# Patient Record
Sex: FEMALE | Race: WHITE | NOT HISPANIC OR LATINO
[De-identification: names, ages, dates, MRNs, and addresses within clinical notes are randomized per-mention and may not be internally consistent; named-entity substitution may affect disease eponyms.]

---

## 2017-01-25 ENCOUNTER — MEDICATION RENEWAL (OUTPATIENT)
Age: 27
End: 2017-01-25

## 2017-01-27 ENCOUNTER — OTHER (OUTPATIENT)
Age: 27
End: 2017-01-27

## 2017-02-08 ENCOUNTER — APPOINTMENT (OUTPATIENT)
Dept: OBGYN | Facility: CLINIC | Age: 27
End: 2017-02-08

## 2017-02-08 VITALS
SYSTOLIC BLOOD PRESSURE: 114 MMHG | HEIGHT: 60 IN | DIASTOLIC BLOOD PRESSURE: 62 MMHG | BODY MASS INDEX: 21.79 KG/M2 | WEIGHT: 111 LBS | HEART RATE: 53 BPM

## 2017-02-08 DIAGNOSIS — R39.15 URGENCY OF URINATION: ICD-10-CM

## 2017-02-08 DIAGNOSIS — N92.6 IRREGULAR MENSTRUATION, UNSPECIFIED: ICD-10-CM

## 2017-02-08 DIAGNOSIS — Z32.02 ENCOUNTER FOR PREGNANCY TEST, RESULT NEGATIVE: ICD-10-CM

## 2017-02-08 DIAGNOSIS — N76.0 ACUTE VAGINITIS: ICD-10-CM

## 2017-02-08 DIAGNOSIS — R10.2 PELVIC AND PERINEAL PAIN: ICD-10-CM

## 2017-02-08 DIAGNOSIS — R14.0 ABDOMINAL DISTENSION (GASEOUS): ICD-10-CM

## 2017-02-10 ENCOUNTER — OTHER (OUTPATIENT)
Age: 27
End: 2017-02-10

## 2017-02-10 LAB
C TRACH DNA SPEC QL NAA+PROBE: NORMAL
C TRACH RRNA SPEC QL NAA+PROBE: NORMAL
N GONORRHOEA DNA SPEC QL NAA+PROBE: NORMAL
N GONORRHOEA RRNA SPEC QL NAA+PROBE: NORMAL
SOURCE AMPLIFICATION: NORMAL

## 2017-02-15 ENCOUNTER — OTHER (OUTPATIENT)
Age: 27
End: 2017-02-15

## 2017-02-15 LAB — CYTOLOGY CVX/VAG DOC THIN PREP: NORMAL

## 2017-04-28 ENCOUNTER — RX RENEWAL (OUTPATIENT)
Age: 27
End: 2017-04-28

## 2017-07-19 ENCOUNTER — OTHER (OUTPATIENT)
Age: 27
End: 2017-07-19

## 2017-08-31 ENCOUNTER — EMERGENCY (EMERGENCY)
Facility: HOSPITAL | Age: 27
LOS: 1 days | Discharge: HOME | End: 2017-08-31
Attending: INTERNAL MEDICINE | Admitting: INTERNAL MEDICINE
Payer: COMMERCIAL

## 2017-08-31 VITALS
SYSTOLIC BLOOD PRESSURE: 114 MMHG | OXYGEN SATURATION: 100 % | RESPIRATION RATE: 12 BRPM | DIASTOLIC BLOOD PRESSURE: 72 MMHG | HEART RATE: 52 BPM

## 2017-08-31 VITALS
RESPIRATION RATE: 16 BRPM | HEART RATE: 62 BPM | DIASTOLIC BLOOD PRESSURE: 72 MMHG | TEMPERATURE: 98 F | WEIGHT: 111.99 LBS | OXYGEN SATURATION: 100 % | SYSTOLIC BLOOD PRESSURE: 110 MMHG | HEIGHT: 62 IN

## 2017-08-31 DIAGNOSIS — Z90.89 ACQUIRED ABSENCE OF OTHER ORGANS: Chronic | ICD-10-CM

## 2017-08-31 LAB
ALBUMIN SERPL ELPH-MCNC: 3.7 G/DL — SIGNIFICANT CHANGE UP (ref 3.3–5)
ALP SERPL-CCNC: 37 U/L — SIGNIFICANT CHANGE UP (ref 30–120)
ALT FLD-CCNC: 23 U/L DA — SIGNIFICANT CHANGE UP (ref 10–60)
ANION GAP SERPL CALC-SCNC: 9 MMOL/L — SIGNIFICANT CHANGE UP (ref 5–17)
AST SERPL-CCNC: 20 U/L — SIGNIFICANT CHANGE UP (ref 10–40)
BASOPHILS # BLD AUTO: 0.1 K/UL — SIGNIFICANT CHANGE UP (ref 0–0.2)
BASOPHILS NFR BLD AUTO: 0.9 % — SIGNIFICANT CHANGE UP (ref 0–2)
BILIRUB SERPL-MCNC: 0.3 MG/DL — SIGNIFICANT CHANGE UP (ref 0.2–1.2)
BUN SERPL-MCNC: 9 MG/DL — SIGNIFICANT CHANGE UP (ref 7–23)
CALCIUM SERPL-MCNC: 8.9 MG/DL — SIGNIFICANT CHANGE UP (ref 8.4–10.5)
CHLORIDE SERPL-SCNC: 103 MMOL/L — SIGNIFICANT CHANGE UP (ref 96–108)
CO2 SERPL-SCNC: 27 MMOL/L — SIGNIFICANT CHANGE UP (ref 22–31)
CREAT SERPL-MCNC: 0.85 MG/DL — SIGNIFICANT CHANGE UP (ref 0.5–1.3)
EOSINOPHIL # BLD AUTO: 0 K/UL — SIGNIFICANT CHANGE UP (ref 0–0.5)
EOSINOPHIL NFR BLD AUTO: 0.6 % — SIGNIFICANT CHANGE UP (ref 0–6)
GLUCOSE SERPL-MCNC: 93 MG/DL — SIGNIFICANT CHANGE UP (ref 70–99)
HCT VFR BLD CALC: 39.8 % — SIGNIFICANT CHANGE UP (ref 34.5–45)
HGB BLD-MCNC: 13.7 G/DL — SIGNIFICANT CHANGE UP (ref 11.5–15.5)
LYMPHOCYTES # BLD AUTO: 1.8 K/UL — SIGNIFICANT CHANGE UP (ref 1–3.3)
LYMPHOCYTES # BLD AUTO: 28.1 % — SIGNIFICANT CHANGE UP (ref 13–44)
MCHC RBC-ENTMCNC: 32.2 PG — SIGNIFICANT CHANGE UP (ref 27–34)
MCHC RBC-ENTMCNC: 34.4 GM/DL — SIGNIFICANT CHANGE UP (ref 32–36)
MCV RBC AUTO: 93.8 FL — SIGNIFICANT CHANGE UP (ref 80–100)
MONOCYTES # BLD AUTO: 0.4 K/UL — SIGNIFICANT CHANGE UP (ref 0–0.9)
MONOCYTES NFR BLD AUTO: 6.2 % — SIGNIFICANT CHANGE UP (ref 2–14)
NEUTROPHILS # BLD AUTO: 4.1 K/UL — SIGNIFICANT CHANGE UP (ref 1.8–7.4)
NEUTROPHILS NFR BLD AUTO: 64.1 % — SIGNIFICANT CHANGE UP (ref 43–77)
PLATELET # BLD AUTO: 157 K/UL — SIGNIFICANT CHANGE UP (ref 150–400)
POTASSIUM SERPL-MCNC: 4.1 MMOL/L — SIGNIFICANT CHANGE UP (ref 3.5–5.3)
POTASSIUM SERPL-SCNC: 4.1 MMOL/L — SIGNIFICANT CHANGE UP (ref 3.5–5.3)
PROT SERPL-MCNC: 6.9 G/DL — SIGNIFICANT CHANGE UP (ref 6–8.3)
RBC # BLD: 4.25 M/UL — SIGNIFICANT CHANGE UP (ref 3.8–5.2)
RBC # FLD: 11.4 % — SIGNIFICANT CHANGE UP (ref 10.3–14.5)
SODIUM SERPL-SCNC: 139 MMOL/L — SIGNIFICANT CHANGE UP (ref 135–145)
WBC # BLD: 6.4 K/UL — SIGNIFICANT CHANGE UP (ref 3.8–10.5)
WBC # FLD AUTO: 6.4 K/UL — SIGNIFICANT CHANGE UP (ref 3.8–10.5)

## 2017-08-31 PROCEDURE — 74177 CT ABD & PELVIS W/CONTRAST: CPT

## 2017-08-31 PROCEDURE — 99284 EMERGENCY DEPT VISIT MOD MDM: CPT | Mod: 25

## 2017-08-31 PROCEDURE — 85027 COMPLETE CBC AUTOMATED: CPT

## 2017-08-31 PROCEDURE — 80053 COMPREHEN METABOLIC PANEL: CPT

## 2017-08-31 PROCEDURE — 99284 EMERGENCY DEPT VISIT MOD MDM: CPT

## 2017-08-31 PROCEDURE — 74177 CT ABD & PELVIS W/CONTRAST: CPT | Mod: 26

## 2017-08-31 RX ORDER — IOHEXOL 300 MG/ML
30 INJECTION, SOLUTION INTRAVENOUS ONCE
Qty: 0 | Refills: 0 | Status: DISCONTINUED | OUTPATIENT
Start: 2017-08-31 | End: 2017-09-04

## 2017-08-31 NOTE — ED PROVIDER NOTE - OBJECTIVE STATEMENT
25 y/o white female with hx of poly cystic ovaries  presents to ED c/o sharp LLQ pain  since yesterday. She states pain is radiating to left flank. hx of IBS, but IBS is not associated with same pain she had since yesterday. no OTC medication. pt's boyfriend is concern she has diverticulitis,. No fevers, chills, hematuria, dysuria. no further complaints at this time. 27 y/o white female with hx of polycystic ovaries  presents to ED c/o sharp LLQ pain  since yesterday. She states pain is radiating to left flank. hx of IBS, but IBS is not associated with same pain she had since yesterday. no OTC medication. pt's boyfriend is concerned that she has diverticulitis,. No fevers, chills, hematuria, dysuria or dc. no further complaints at this time.pt and boyfriend are somewhat demanding a ct scan.

## 2017-08-31 NOTE — ED PROVIDER NOTE - NS_ ATTENDINGSCRIBEDETAILS _ED_A_ED_FT
Julián Preciado MD - The scribe's documentation has been prepared under my direction and personally reviewed by me in its entirety. I confirm that the note above accurately reflects all work, treatment, procedures, and medical decision making performed by me.

## 2017-08-31 NOTE — ED ADULT NURSE NOTE - NS ED NURSE DC INFO COMPLEXITY
Complex: Multiple Rx/Tx. Pt has difficulty understanding. Requires additional help/Patient asked questions/Verbalized Understanding

## 2017-08-31 NOTE — ED ADULT NURSE NOTE - OBJECTIVE STATEMENT
Patient received AOx3 complaining of LLQ pain that radiates to left flank, denies difficulty urinating, fever, chills n/v/d. As per patient has history of IBS, is concerned it can be diverticulitis.

## 2017-08-31 NOTE — ED ADULT NURSE REASSESSMENT NOTE - NS ED NURSE REASSESS COMMENT FT1
PO Oral contrast completed at 1500. Awaiting CT scan.
pt re evaluated by md and to be d'c/d pt discharged stable and ambulatory in nad at present d/c instruction reinforced and pt verbalized understanding vital signs as charted  iv d'c/d  no s/s infiltration upon removal no vomiting pt feels better to f/u pmd

## 2017-08-31 NOTE — ED PROVIDER NOTE - MEDICAL DECISION MAKING DETAILS
abdom pain with h/o ibs, but never really worked up...pt/boyfriend rather convinced pt needs ct despite my recommendation that it seems unnecessary.ct only with large amt feces.d/w pt her vegan diet,fluids and liquids like prune juice.

## 2017-08-31 NOTE — ED ADULT TRIAGE NOTE - CHIEF COMPLAINT QUOTE
ibs constipation c/o llq pains denies fever or chills denies n/v sexually active  no vag d/c  denies dysuria

## 2018-01-18 NOTE — ED PROVIDER NOTE - GASTROINTESTINAL [+], MLM
Chief Complaint   Patient presents with   • Cough   • Vomiting   • Chills         HPI: The pt complains of cough, vomiting secondary to cough, fevers, and chills. He has a history of asthma.        Pe: Coarse breath sound with expiratory wheezing in bilateral lower lobes, and clear nasal discharge.         Plan: Influenza swab, CXR, respiratory treatment, and further evaluation by an ED provider.     ________________________________________________________________    I have reviewed the information recorded by the scribe for accuracy and agree with its contents.    Katie Pardo acting as scribe for Reanna Alvarenga PA-C    Scribe: Katie Pardo  Physician Assistant: Reanna Alvarenga PA-C  1/18/2018       LIZ Yeh  01/18/18 0268    
ABDOMINAL PAIN

## 2018-04-26 ENCOUNTER — RX RENEWAL (OUTPATIENT)
Age: 28
End: 2018-04-26

## 2018-04-28 ENCOUNTER — OUTPATIENT (OUTPATIENT)
Dept: OUTPATIENT SERVICES | Facility: HOSPITAL | Age: 28
LOS: 1 days | End: 2018-04-28
Payer: COMMERCIAL

## 2018-04-28 ENCOUNTER — APPOINTMENT (OUTPATIENT)
Dept: RADIOLOGY | Facility: HOSPITAL | Age: 28
End: 2018-04-28
Payer: COMMERCIAL

## 2018-04-28 DIAGNOSIS — Z00.8 ENCOUNTER FOR OTHER GENERAL EXAMINATION: ICD-10-CM

## 2018-04-28 DIAGNOSIS — Z90.89 ACQUIRED ABSENCE OF OTHER ORGANS: Chronic | ICD-10-CM

## 2018-04-28 PROCEDURE — 74022 RADEX COMPL AQT ABD SERIES: CPT | Mod: 26

## 2018-04-28 PROCEDURE — 74022 RADEX COMPL AQT ABD SERIES: CPT

## 2018-05-15 ENCOUNTER — OTHER (OUTPATIENT)
Age: 28
End: 2018-05-15

## 2018-05-29 ENCOUNTER — APPOINTMENT (OUTPATIENT)
Dept: OBGYN | Facility: CLINIC | Age: 28
End: 2018-05-29

## 2018-07-23 ENCOUNTER — APPOINTMENT (OUTPATIENT)
Dept: OBGYN | Facility: CLINIC | Age: 28
End: 2018-07-23

## 2018-07-23 DIAGNOSIS — Z30.41 ENCOUNTER FOR SURVEILLANCE OF CONTRACEPTIVE PILLS: ICD-10-CM

## 2018-07-30 ENCOUNTER — RESULT REVIEW (OUTPATIENT)
Age: 28
End: 2018-07-30

## 2018-11-29 ENCOUNTER — EMERGENCY (EMERGENCY)
Facility: HOSPITAL | Age: 28
LOS: 1 days | Discharge: HOME | End: 2018-11-29
Attending: EMERGENCY MEDICINE | Admitting: EMERGENCY MEDICINE
Payer: COMMERCIAL

## 2018-11-29 VITALS
TEMPERATURE: 98 F | DIASTOLIC BLOOD PRESSURE: 68 MMHG | RESPIRATION RATE: 18 BRPM | HEART RATE: 71 BPM | HEIGHT: 61 IN | SYSTOLIC BLOOD PRESSURE: 126 MMHG | OXYGEN SATURATION: 100 % | WEIGHT: 110.01 LBS

## 2018-11-29 DIAGNOSIS — Z90.89 ACQUIRED ABSENCE OF OTHER ORGANS: Chronic | ICD-10-CM

## 2018-11-29 PROCEDURE — 99283 EMERGENCY DEPT VISIT LOW MDM: CPT

## 2018-11-29 PROCEDURE — 73610 X-RAY EXAM OF ANKLE: CPT | Mod: 26,LT

## 2018-11-29 PROCEDURE — 73610 X-RAY EXAM OF ANKLE: CPT

## 2018-11-29 RX ORDER — IBUPROFEN 200 MG
600 TABLET ORAL ONCE
Qty: 0 | Refills: 0 | Status: COMPLETED | OUTPATIENT
Start: 2018-11-29 | End: 2018-11-29

## 2018-11-29 RX ADMIN — Medication 600 MILLIGRAM(S): at 19:36

## 2018-11-29 RX ADMIN — Medication 600 MILLIGRAM(S): at 20:36

## 2018-11-29 NOTE — ED ADULT NURSE NOTE - NSIMPLEMENTINTERV_GEN_ALL_ED
Implemented All Universal Safety Interventions:  [place] to call system. Call bell, personal items and telephone within reach. Instruct patient to call for assistance. Room bathroom lighting operational. Non-slip footwear when patient is off stretcher. Physically safe environment: no spills, clutter or unnecessary equipment. Stretcher in lowest position, wheels locked, appropriate side rails in place.

## 2018-11-29 NOTE — ED ADULT NURSE NOTE - OBJECTIVE STATEMENT
pt reports twisting left ankle while running. swelling, +1 swelling. no bruising noted. pt reports pain 8/10.

## 2018-11-29 NOTE — ED PROVIDER NOTE - OBJECTIVE STATEMENT
pt rolled her L ankle today around 5p while running outside when she stepped in a ditch.  pt able to bare weight but has swelling and mild pain.

## 2018-11-30 PROBLEM — K58.9 IRRITABLE BOWEL SYNDROME WITHOUT DIARRHEA: Chronic | Status: ACTIVE | Noted: 2017-08-31

## 2019-01-14 ENCOUNTER — APPOINTMENT (OUTPATIENT)
Dept: ORTHOPEDIC SURGERY | Facility: CLINIC | Age: 29
End: 2019-01-14

## 2019-01-27 ENCOUNTER — TRANSCRIPTION ENCOUNTER (OUTPATIENT)
Age: 29
End: 2019-01-27

## 2019-03-01 ENCOUNTER — EMERGENCY (EMERGENCY)
Facility: HOSPITAL | Age: 29
LOS: 1 days | Discharge: HOME | End: 2019-03-01
Attending: EMERGENCY MEDICINE
Payer: COMMERCIAL

## 2019-03-01 VITALS
DIASTOLIC BLOOD PRESSURE: 78 MMHG | HEART RATE: 57 BPM | SYSTOLIC BLOOD PRESSURE: 113 MMHG | TEMPERATURE: 98 F | RESPIRATION RATE: 18 BRPM | OXYGEN SATURATION: 99 %

## 2019-03-01 VITALS
OXYGEN SATURATION: 99 % | SYSTOLIC BLOOD PRESSURE: 147 MMHG | RESPIRATION RATE: 17 BRPM | HEART RATE: 63 BPM | DIASTOLIC BLOOD PRESSURE: 94 MMHG | TEMPERATURE: 99 F

## 2019-03-01 DIAGNOSIS — Z90.89 ACQUIRED ABSENCE OF OTHER ORGANS: Chronic | ICD-10-CM

## 2019-03-01 LAB
ALBUMIN SERPL ELPH-MCNC: 4.6 G/DL — SIGNIFICANT CHANGE UP (ref 3.3–5)
ALP SERPL-CCNC: 46 U/L — SIGNIFICANT CHANGE UP (ref 40–120)
ALT FLD-CCNC: 16 U/L — SIGNIFICANT CHANGE UP (ref 10–45)
ANION GAP SERPL CALC-SCNC: 12 MMOL/L — SIGNIFICANT CHANGE UP (ref 5–17)
APPEARANCE UR: CLEAR — SIGNIFICANT CHANGE UP
AST SERPL-CCNC: 22 U/L — SIGNIFICANT CHANGE UP (ref 10–40)
BACTERIA # UR AUTO: NEGATIVE — SIGNIFICANT CHANGE UP
BASOPHILS # BLD AUTO: 0 K/UL — SIGNIFICANT CHANGE UP (ref 0–0.2)
BASOPHILS NFR BLD AUTO: 0.5 % — SIGNIFICANT CHANGE UP (ref 0–2)
BILIRUB SERPL-MCNC: 0.1 MG/DL — LOW (ref 0.2–1.2)
BILIRUB UR-MCNC: NEGATIVE — SIGNIFICANT CHANGE UP
BUN SERPL-MCNC: 18 MG/DL — SIGNIFICANT CHANGE UP (ref 7–23)
CALCIUM SERPL-MCNC: 9.6 MG/DL — SIGNIFICANT CHANGE UP (ref 8.4–10.5)
CHLORIDE SERPL-SCNC: 101 MMOL/L — SIGNIFICANT CHANGE UP (ref 96–108)
CO2 SERPL-SCNC: 24 MMOL/L — SIGNIFICANT CHANGE UP (ref 22–31)
COLOR SPEC: COLORLESS — SIGNIFICANT CHANGE UP
CREAT SERPL-MCNC: 0.82 MG/DL — SIGNIFICANT CHANGE UP (ref 0.5–1.3)
DIFF PNL FLD: NEGATIVE — SIGNIFICANT CHANGE UP
EOSINOPHIL # BLD AUTO: 0.1 K/UL — SIGNIFICANT CHANGE UP (ref 0–0.5)
EOSINOPHIL NFR BLD AUTO: 1.2 % — SIGNIFICANT CHANGE UP (ref 0–6)
EPI CELLS # UR: 1 /HPF — SIGNIFICANT CHANGE UP
GLUCOSE SERPL-MCNC: 87 MG/DL — SIGNIFICANT CHANGE UP (ref 70–99)
GLUCOSE UR QL: NEGATIVE — SIGNIFICANT CHANGE UP
HCT VFR BLD CALC: 41.2 % — SIGNIFICANT CHANGE UP (ref 34.5–45)
HGB BLD-MCNC: 14.5 G/DL — SIGNIFICANT CHANGE UP (ref 11.5–15.5)
HIV 1 & 2 AB SERPL IA.RAPID: SIGNIFICANT CHANGE UP
HYALINE CASTS # UR AUTO: 1 /LPF — SIGNIFICANT CHANGE UP (ref 0–2)
KETONES UR-MCNC: NEGATIVE — SIGNIFICANT CHANGE UP
LEUKOCYTE ESTERASE UR-ACNC: NEGATIVE — SIGNIFICANT CHANGE UP
LYMPHOCYTES # BLD AUTO: 1.8 K/UL — SIGNIFICANT CHANGE UP (ref 1–3.3)
LYMPHOCYTES # BLD AUTO: 23.8 % — SIGNIFICANT CHANGE UP (ref 13–44)
MAGNESIUM SERPL-MCNC: 2.2 MG/DL — SIGNIFICANT CHANGE UP (ref 1.6–2.6)
MCHC RBC-ENTMCNC: 32.1 PG — SIGNIFICANT CHANGE UP (ref 27–34)
MCHC RBC-ENTMCNC: 35.2 GM/DL — SIGNIFICANT CHANGE UP (ref 32–36)
MCV RBC AUTO: 91.1 FL — SIGNIFICANT CHANGE UP (ref 80–100)
MONOCYTES # BLD AUTO: 0.4 K/UL — SIGNIFICANT CHANGE UP (ref 0–0.9)
MONOCYTES NFR BLD AUTO: 5.8 % — SIGNIFICANT CHANGE UP (ref 2–14)
NEUTROPHILS # BLD AUTO: 5.1 K/UL — SIGNIFICANT CHANGE UP (ref 1.8–7.4)
NEUTROPHILS NFR BLD AUTO: 68.7 % — SIGNIFICANT CHANGE UP (ref 43–77)
NITRITE UR-MCNC: NEGATIVE — SIGNIFICANT CHANGE UP
PH UR: 6.5 — SIGNIFICANT CHANGE UP (ref 5–8)
PLATELET # BLD AUTO: 177 K/UL — SIGNIFICANT CHANGE UP (ref 150–400)
POTASSIUM SERPL-MCNC: 3.9 MMOL/L — SIGNIFICANT CHANGE UP (ref 3.5–5.3)
POTASSIUM SERPL-SCNC: 3.9 MMOL/L — SIGNIFICANT CHANGE UP (ref 3.5–5.3)
PROT SERPL-MCNC: 7.2 G/DL — SIGNIFICANT CHANGE UP (ref 6–8.3)
PROT UR-MCNC: NEGATIVE — SIGNIFICANT CHANGE UP
RBC # BLD: 4.52 M/UL — SIGNIFICANT CHANGE UP (ref 3.8–5.2)
RBC # FLD: 11.8 % — SIGNIFICANT CHANGE UP (ref 10.3–14.5)
RBC CASTS # UR COMP ASSIST: 1 /HPF — SIGNIFICANT CHANGE UP (ref 0–4)
SODIUM SERPL-SCNC: 137 MMOL/L — SIGNIFICANT CHANGE UP (ref 135–145)
SP GR SPEC: 1.01 — LOW (ref 1.01–1.02)
UROBILINOGEN FLD QL: NEGATIVE — SIGNIFICANT CHANGE UP
WBC # BLD: 7.5 K/UL — SIGNIFICANT CHANGE UP (ref 3.8–10.5)
WBC # FLD AUTO: 7.5 K/UL — SIGNIFICANT CHANGE UP (ref 3.8–10.5)
WBC UR QL: 0 /HPF — SIGNIFICANT CHANGE UP (ref 0–5)

## 2019-03-01 PROCEDURE — 83735 ASSAY OF MAGNESIUM: CPT

## 2019-03-01 PROCEDURE — 76830 TRANSVAGINAL US NON-OB: CPT | Mod: 26

## 2019-03-01 PROCEDURE — 74018 RADEX ABDOMEN 1 VIEW: CPT | Mod: 26

## 2019-03-01 PROCEDURE — 99284 EMERGENCY DEPT VISIT MOD MDM: CPT

## 2019-03-01 PROCEDURE — 85027 COMPLETE CBC AUTOMATED: CPT

## 2019-03-01 PROCEDURE — 93975 VASCULAR STUDY: CPT | Mod: 26

## 2019-03-01 PROCEDURE — 80053 COMPREHEN METABOLIC PANEL: CPT

## 2019-03-01 PROCEDURE — 87086 URINE CULTURE/COLONY COUNT: CPT

## 2019-03-01 PROCEDURE — 86703 HIV-1/HIV-2 1 RESULT ANTBDY: CPT

## 2019-03-01 PROCEDURE — 93975 VASCULAR STUDY: CPT

## 2019-03-01 PROCEDURE — 99284 EMERGENCY DEPT VISIT MOD MDM: CPT | Mod: 25

## 2019-03-01 PROCEDURE — 81001 URINALYSIS AUTO W/SCOPE: CPT

## 2019-03-01 PROCEDURE — 74018 RADEX ABDOMEN 1 VIEW: CPT

## 2019-03-01 PROCEDURE — 96374 THER/PROPH/DIAG INJ IV PUSH: CPT

## 2019-03-01 PROCEDURE — 76830 TRANSVAGINAL US NON-OB: CPT

## 2019-03-01 RX ORDER — ONDANSETRON 8 MG/1
4 TABLET, FILM COATED ORAL ONCE
Qty: 0 | Refills: 0 | Status: COMPLETED | OUTPATIENT
Start: 2019-03-01 | End: 2019-03-01

## 2019-03-01 RX ORDER — ACETAMINOPHEN 500 MG
975 TABLET ORAL ONCE
Qty: 0 | Refills: 0 | Status: COMPLETED | OUTPATIENT
Start: 2019-03-01 | End: 2019-03-01

## 2019-03-01 RX ORDER — SODIUM CHLORIDE 9 MG/ML
1000 INJECTION INTRAMUSCULAR; INTRAVENOUS; SUBCUTANEOUS ONCE
Qty: 0 | Refills: 0 | Status: COMPLETED | OUTPATIENT
Start: 2019-03-01 | End: 2019-03-01

## 2019-03-01 RX ORDER — KETOROLAC TROMETHAMINE 30 MG/ML
15 SYRINGE (ML) INJECTION ONCE
Qty: 0 | Refills: 0 | Status: DISCONTINUED | OUTPATIENT
Start: 2019-03-01 | End: 2019-03-01

## 2019-03-01 RX ADMIN — Medication 15 MILLIGRAM(S): at 18:57

## 2019-03-01 RX ADMIN — Medication 975 MILLIGRAM(S): at 18:59

## 2019-03-01 RX ADMIN — SODIUM CHLORIDE 1000 MILLILITER(S): 9 INJECTION INTRAMUSCULAR; INTRAVENOUS; SUBCUTANEOUS at 18:59

## 2019-03-01 NOTE — ED PROVIDER NOTE - NSFOLLOWUPINSTRUCTIONS_ED_ALL_ED_FT
-Follow-up with your primary care provider within 2-3 days.   -Follow-up with your GI doctor as scheduled.    -Bring all printed results to discuss with your PCP.    -Take Magnesium Citrate liquid form (290mg/5mL) over the counter, take 150mL at a time with at least 8 ounces of water, 2 times per day tomorrow. If constipation is not relieved repeat this the following day.      Drink plenty of water while taking magnesium citrate. And do not take another dose after you have a      loose bowel movement.    -Continue to take all other medications as directed.    -Return to the emergency room immediately if your symptoms worsen or persist, or if you have a high or concerning fever, abdominal pain, bloody stool, severe vomiting, inability to keep food or water down, lightheadedness, dizziness, loss of consciousness (passing out) or if any other concerning or questionable symptoms.

## 2019-03-01 NOTE — ED PROVIDER NOTE - PHYSICAL EXAMINATION
GEN: Pt in NAD, A&O x3.  PSYCH: Affect appropriate.  EYES: Sclera white w/o injection, PERRLA.   ENT: Head NCAT. Nose w/o deformity. No auricular TTP. MMM. Neck supple FROM.   RESP: No chest wall tenderness, CTA b/l, no wheezes, rales, or rhonchi.   CARDIAC: RRR, clear distinct S1, S2, no appreciable murmurs.  ABD: Abdomen flat, NABS, soft, +suprapubic ttp, no rebound or guarding, negative psoas, obturator, and rovsing sign. No CVAT b/l.  VASC: No edema or calf tenderness.  SKIN: No rashes on the trunk.

## 2019-03-01 NOTE — ED ADULT NURSE NOTE - OBJECTIVE STATEMENT
Pt has been having low abd and pelvic pain, different from her IBS pain x 1 month.  Has been seen at urgent care and did 3 day course of abx for possible uti.  No burning with urination, but does have some frequency.  Oral mucosa slightly dry.  Abd soft.

## 2019-03-01 NOTE — ED PROVIDER NOTE - OBJECTIVE STATEMENT
29 y/o F with PMHx of IBS w/ predominant constipation presents with Mom at bedside c/o suprapubic pain and frequency x 1 month worsening x 3 days. Pt also notes a feeling of fatigue and malaise x 1 month also worsening the last 3 days, constipation and low back pain worse x 3 days last BM brown yesterday, frequent nausea w/o vomiting. Suprapubic pain is constant, currently moderate-severe, no radiation, dull intermittently sharp, worse with walking and eating. Pt was seen at UC 3 weeks ago given Keflex x3 days and stopped du to negative UCx. Pt saw a GI yesterday and schedules for outpt colonoscopy/endoscopy. Pt notes that she has had IBS with frequent episodes of constipation and abd pain but usually do not last this long. Pt denies f/c, recent illness, hematuria, h/o kidney stones/pyleo, melena, hemaotchezia, new meds (only on OCPs), rashes, joint pain, flank pain, h/o abd surgery. 27 y/o F with PMHx of IBS w/ predominant constipation presents with Mom at bedside c/o suprapubic pain and frequency x 1 month worsening x 3 days. Pt also notes a feeling of fatigue and malaise x 1 month also worsening the last 3 days, constipation and low back pain worse x 3 days last BM brown yesterday, frequent nausea w/o vomiting. Suprapubic pain is constant, currently moderate-severe, no radiation, dull intermittently sharp, worse with walking and eating. Pt was seen at UC 3 weeks ago given Keflex x3 days and stopped du to negative UCx. Pt saw a GI yesterday and schedules for outpt colonoscopy/endoscopy. Pt notes that she has had IBS with frequent episodes of constipation and abd pain but usually do not last this long. Pt denies f/c, recent illness, hematuria, h/o kidney stones/pyleo, melena, hemaotchezia, new meds (only on OCPs), rashes, joint pain, flank pain, h/o abd surgery, vag bleed/DC. LMP 3 weeks ago.

## 2019-03-01 NOTE — ED PROVIDER NOTE - PLAN OF CARE
Follow-up with your primary care provider within 2-3 days.   Follow-up with your GI doctor as scheduled.  Bring all printed results to discuss with your PCP.  Take Magnesium Citrate liquid form (290mg/5mL) over the counter, take 150mL at a time with at least 8 ounces of water, 2 times per day tomorrow. If constipation is not relieved repeat this the following day. Drink plenty of water while taking magnesium citrate. And do not take another dose after you have a loose bowel movement.  Continue to take all other medications as directed.  Return to the emergency room immediately if your symptoms worsen or persist, or if you have a high or concerning fever, abdominal pain, bloody stool, severe vomiting, inability to keep food or water down, lightheadedness, dizziness, loss of consciousness (passing out) or if any other concerning or questionable symptoms.

## 2019-03-01 NOTE — ED PROVIDER NOTE - PROGRESS NOTE DETAILS
discussed results with pt thus far CBC/CMP/MG/HIV/AXR, pt is feeling better after Tylenol and Toradol, visibly improved, laughing and talking on phone. Pt amenable to Magnesium citrate laxative outpt pending non-actionable TVUS, states she tried Mg laxative 2 weeks ago 1/2 dose with some liquid stool but not complete relief. Colonoscopy is in >2 weeks, explained importance of hydration during bowel prep and laxative use to prevent dehydration. -Julio Cobos PA-C All results d/w pt, pt given return precautions and told to f/u with GI as scheduled for colonoscopy, call to inform she was in the ER. Follow-up PCP within 48 hours. Pain meds at home and instructions for laxative use. Pt understands and agrees to plan, all questions answered, pt ready and stable for DC. -Julio Cobos PA-C

## 2019-03-01 NOTE — ED PROVIDER NOTE - CLINICAL SUMMARY MEDICAL DECISION MAKING FREE TEXT BOX
MARCELO Ken MD: Pt is a 29 y/o female with PMH of IBS (predominant constipation sx) who p/w c/o abd pain and constipation. Per patient and her mother, pt c/o suprapubic pain and frequency x 1 month, worsening x 3 days. Also c/o constipation and low back pain, worse x 3 days. Patient states last BM was yesterday. Also c/o frequent nausea, no vomiting. Suprapubic pain is constant, currently moderate-severe, no radiation, dull intermittently sharp, worse with walking and eating. Pt was seen at UC 3 weeks ago given Keflex x3 days for a possible UTI, but was advised to stop due to negative UCx. Pt saw a GI yesterday who scheduled pt for outpt colonoscopy/endoscopy. Pt notes that she has had IBS with frequent episodes of similar constipation and abd pain, however, the sx usually do not last this long. Pt denies f/c, recent illness, hematuria, h/o kidney stones/pyleo, melena, hemaotchezia, new meds (only on OCPs), rashes, joint pain, flank pain, h/o abd surgery, vag bleed/DC. LMP 2 weeks ago. Exam unremarkable aside from b/l lower abd ttp, without rebound/guarding/rigidity. Neg rosving's sign. Neg mcburney's point ttp. Neg CVAT. Well-appearing, non-toxic. DDx: IBS, constipation, ovarian cyst, pregnancy. Less likely appy given 1 month of sx. Plan: pain control, basic labs, upreg, u/a, ucx, AXR to look for stool burden, TVUS. Discussed risks and benefits of CT scan with patient. She does not want to have CT scan at this time. Low suspicion for appy at this time, however, if initial w/u negative, pt may be amenable to CT scan for further eval.

## 2019-03-01 NOTE — ED PROVIDER NOTE - CARE PLAN
Principal Discharge DX:	Abdominal pain  Assessment and plan of treatment:	Follow-up with your primary care provider within 2-3 days.   Follow-up with your GI doctor as scheduled.  Bring all printed results to discuss with your PCP.  Take Magnesium Citrate liquid form (290mg/5mL) over the counter, take 150mL at a time with at least 8 ounces of water, 2 times per day tomorrow. If constipation is not relieved repeat this the following day. Drink plenty of water while taking magnesium citrate. And do not take another dose after you have a loose bowel movement.  Continue to take all other medications as directed.  Return to the emergency room immediately if your symptoms worsen or persist, or if you have a high or concerning fever, abdominal pain, bloody stool, severe vomiting, inability to keep food or water down, lightheadedness, dizziness, loss of consciousness (passing out) or if any other concerning or questionable symptoms.  Secondary Diagnosis:	Constipation

## 2019-03-02 LAB
CULTURE RESULTS: NO GROWTH — SIGNIFICANT CHANGE UP
SPECIMEN SOURCE: SIGNIFICANT CHANGE UP

## 2019-03-02 NOTE — ED POST DISCHARGE NOTE - RESULT SUMMARY
small calcification in pelvis previously not see on abd xray likely phlebolith, called to inform pt and see how she was feeling

## 2019-03-02 NOTE — ED POST DISCHARGE NOTE - DETAILS
Spoke with pt feeling better, informed of Xray findings and its likely benign nature and to follow up if she has any concerns with her PCP.  Sly

## 2019-06-10 ENCOUNTER — APPOINTMENT (OUTPATIENT)
Dept: INTERNAL MEDICINE | Facility: CLINIC | Age: 29
End: 2019-06-10
Payer: COMMERCIAL

## 2019-06-10 VITALS
SYSTOLIC BLOOD PRESSURE: 108 MMHG | RESPIRATION RATE: 16 BRPM | TEMPERATURE: 98 F | DIASTOLIC BLOOD PRESSURE: 70 MMHG | HEART RATE: 60 BPM | WEIGHT: 112 LBS | OXYGEN SATURATION: 100 % | HEIGHT: 60 IN | BODY MASS INDEX: 21.99 KG/M2

## 2019-06-10 DIAGNOSIS — Z78.9 OTHER SPECIFIED HEALTH STATUS: ICD-10-CM

## 2019-06-10 DIAGNOSIS — Z80.3 FAMILY HISTORY OF MALIGNANT NEOPLASM OF BREAST: ICD-10-CM

## 2019-06-10 DIAGNOSIS — N94.9 UNSPECIFIED CONDITION ASSOCIATED WITH FEMALE GENITAL ORGANS AND MENSTRUAL CYCLE: ICD-10-CM

## 2019-06-10 DIAGNOSIS — Z01.419 ENCOUNTER FOR GYNECOLOGICAL EXAMINATION (GENERAL) (ROUTINE) W/OUT ABNORMAL FINDINGS: ICD-10-CM

## 2019-06-10 DIAGNOSIS — K59.09 OTHER CONSTIPATION: ICD-10-CM

## 2019-06-10 PROCEDURE — 99204 OFFICE O/P NEW MOD 45 MIN: CPT

## 2019-06-10 RX ORDER — DESOGESTREL AND ETHINYL ESTRADIOL 0.15-0.03
0.15-3 KIT ORAL DAILY
Qty: 3 | Refills: 0 | Status: DISCONTINUED | COMMUNITY
Start: 2017-02-08 | End: 2019-06-10

## 2019-06-12 LAB
BASOPHILS # BLD AUTO: 0.02 K/UL
BASOPHILS NFR BLD AUTO: 0.5 %
EOSINOPHIL # BLD AUTO: 0.02 K/UL
EOSINOPHIL NFR BLD AUTO: 0.5 %
HCT VFR BLD CALC: 39.9 %
HGB BLD-MCNC: 13.4 G/DL
IF BLOCK AB SER QL: NORMAL
IMM GRANULOCYTES NFR BLD AUTO: 0.3 %
LYMPHOCYTES # BLD AUTO: 1.57 K/UL
LYMPHOCYTES NFR BLD AUTO: 40.7 %
MAN DIFF?: NORMAL
MCHC RBC-ENTMCNC: 31.8 PG
MCHC RBC-ENTMCNC: 33.6 GM/DL
MCV RBC AUTO: 94.5 FL
METHYLMALONATE SERPL-SCNC: 64 NMOL/L
MONOCYTES # BLD AUTO: 0.39 K/UL
MONOCYTES NFR BLD AUTO: 10.1 %
NEUTROPHILS # BLD AUTO: 1.85 K/UL
NEUTROPHILS NFR BLD AUTO: 47.9 %
PLATELET # BLD AUTO: 193 K/UL
RBC # BLD: 4.22 M/UL
RBC # FLD: 12.4 %
T4 FREE SERPL-MCNC: 1 NG/DL
TSH SERPL-ACNC: 1.37 UIU/ML
VIT B12 SERPL-MCNC: 898 PG/ML
WBC # FLD AUTO: 3.86 K/UL

## 2019-06-19 LAB
TTG IGA SER IA-ACNC: <1.2 U/ML
TTG IGA SER-ACNC: NEGATIVE
TTG IGG SER IA-ACNC: 1.3 U/ML
TTG IGG SER IA-ACNC: NEGATIVE

## 2019-07-11 ENCOUNTER — APPOINTMENT (OUTPATIENT)
Dept: INTERNAL MEDICINE | Facility: CLINIC | Age: 29
End: 2019-07-11
Payer: COMMERCIAL

## 2019-07-11 VITALS
HEART RATE: 56 BPM | OXYGEN SATURATION: 98 % | HEIGHT: 70 IN | BODY MASS INDEX: 16.03 KG/M2 | SYSTOLIC BLOOD PRESSURE: 110 MMHG | WEIGHT: 112 LBS | DIASTOLIC BLOOD PRESSURE: 74 MMHG | RESPIRATION RATE: 16 BRPM | TEMPERATURE: 98 F

## 2019-07-11 DIAGNOSIS — K58.9 IRRITABLE BOWEL SYNDROME W/OUT DIARRHEA: ICD-10-CM

## 2019-07-11 DIAGNOSIS — E53.8 DEFICIENCY OF OTHER SPECIFIED B GROUP VITAMINS: ICD-10-CM

## 2019-07-11 DIAGNOSIS — R53.83 OTHER FATIGUE: ICD-10-CM

## 2019-07-11 PROCEDURE — 99214 OFFICE O/P EST MOD 30 MIN: CPT

## 2019-09-19 ENCOUNTER — EMERGENCY (EMERGENCY)
Facility: HOSPITAL | Age: 29
LOS: 1 days | Discharge: HOME | End: 2019-09-19
Attending: EMERGENCY MEDICINE
Payer: COMMERCIAL

## 2019-09-19 VITALS
DIASTOLIC BLOOD PRESSURE: 75 MMHG | SYSTOLIC BLOOD PRESSURE: 112 MMHG | RESPIRATION RATE: 17 BRPM | OXYGEN SATURATION: 97 % | HEIGHT: 61 IN | TEMPERATURE: 98 F | HEART RATE: 61 BPM | WEIGHT: 111.99 LBS

## 2019-09-19 VITALS
RESPIRATION RATE: 16 BRPM | SYSTOLIC BLOOD PRESSURE: 110 MMHG | HEART RATE: 58 BPM | OXYGEN SATURATION: 98 % | TEMPERATURE: 98 F | DIASTOLIC BLOOD PRESSURE: 72 MMHG

## 2019-09-19 DIAGNOSIS — Z90.89 ACQUIRED ABSENCE OF OTHER ORGANS: Chronic | ICD-10-CM

## 2019-09-19 LAB
ALBUMIN SERPL ELPH-MCNC: 4 G/DL — SIGNIFICANT CHANGE UP (ref 3.3–5)
ALP SERPL-CCNC: 42 U/L — SIGNIFICANT CHANGE UP (ref 40–120)
ALT FLD-CCNC: 13 U/L — SIGNIFICANT CHANGE UP (ref 10–45)
ANION GAP SERPL CALC-SCNC: 12 MMOL/L — SIGNIFICANT CHANGE UP (ref 5–17)
APPEARANCE UR: CLEAR — SIGNIFICANT CHANGE UP
APTT BLD: 30.2 SEC — SIGNIFICANT CHANGE UP (ref 27.5–36.3)
AST SERPL-CCNC: 22 U/L — SIGNIFICANT CHANGE UP (ref 10–40)
BACTERIA # UR AUTO: NEGATIVE — SIGNIFICANT CHANGE UP
BASOPHILS # BLD AUTO: 0 K/UL — SIGNIFICANT CHANGE UP (ref 0–0.2)
BASOPHILS NFR BLD AUTO: 0.5 % — SIGNIFICANT CHANGE UP (ref 0–2)
BILIRUB SERPL-MCNC: 0.1 MG/DL — LOW (ref 0.2–1.2)
BILIRUB UR-MCNC: NEGATIVE — SIGNIFICANT CHANGE UP
BUN SERPL-MCNC: 12 MG/DL — SIGNIFICANT CHANGE UP (ref 7–23)
CALCIUM SERPL-MCNC: 9.3 MG/DL — SIGNIFICANT CHANGE UP (ref 8.4–10.5)
CHLORIDE SERPL-SCNC: 103 MMOL/L — SIGNIFICANT CHANGE UP (ref 96–108)
CO2 SERPL-SCNC: 25 MMOL/L — SIGNIFICANT CHANGE UP (ref 22–31)
COLOR SPEC: SIGNIFICANT CHANGE UP
CREAT SERPL-MCNC: 0.89 MG/DL — SIGNIFICANT CHANGE UP (ref 0.5–1.3)
DIFF PNL FLD: NEGATIVE — SIGNIFICANT CHANGE UP
EOSINOPHIL # BLD AUTO: 0.1 K/UL — SIGNIFICANT CHANGE UP (ref 0–0.5)
EOSINOPHIL NFR BLD AUTO: 0.8 % — SIGNIFICANT CHANGE UP (ref 0–6)
EPI CELLS # UR: 3 /HPF — SIGNIFICANT CHANGE UP
GAS PNL BLDV: SIGNIFICANT CHANGE UP
GLUCOSE SERPL-MCNC: 108 MG/DL — HIGH (ref 70–99)
GLUCOSE UR QL: NEGATIVE — SIGNIFICANT CHANGE UP
HCG SERPL-ACNC: <2 MIU/ML — SIGNIFICANT CHANGE UP
HCG UR QL: NEGATIVE — SIGNIFICANT CHANGE UP
HCT VFR BLD CALC: 36.8 % — SIGNIFICANT CHANGE UP (ref 34.5–45)
HGB BLD-MCNC: 13.3 G/DL — SIGNIFICANT CHANGE UP (ref 11.5–15.5)
HYALINE CASTS # UR AUTO: 0 /LPF — SIGNIFICANT CHANGE UP (ref 0–2)
INR BLD: 1.02 RATIO — SIGNIFICANT CHANGE UP (ref 0.88–1.16)
KETONES UR-MCNC: NEGATIVE — SIGNIFICANT CHANGE UP
LEUKOCYTE ESTERASE UR-ACNC: NEGATIVE — SIGNIFICANT CHANGE UP
LYMPHOCYTES # BLD AUTO: 2.2 K/UL — SIGNIFICANT CHANGE UP (ref 1–3.3)
LYMPHOCYTES # BLD AUTO: 35.3 % — SIGNIFICANT CHANGE UP (ref 13–44)
MCHC RBC-ENTMCNC: 34.8 PG — HIGH (ref 27–34)
MCHC RBC-ENTMCNC: 36.2 GM/DL — HIGH (ref 32–36)
MCV RBC AUTO: 96.1 FL — SIGNIFICANT CHANGE UP (ref 80–100)
MONOCYTES # BLD AUTO: 0.4 K/UL — SIGNIFICANT CHANGE UP (ref 0–0.9)
MONOCYTES NFR BLD AUTO: 6.4 % — SIGNIFICANT CHANGE UP (ref 2–14)
NEUTROPHILS # BLD AUTO: 3.6 K/UL — SIGNIFICANT CHANGE UP (ref 1.8–7.4)
NEUTROPHILS NFR BLD AUTO: 57 % — SIGNIFICANT CHANGE UP (ref 43–77)
NITRITE UR-MCNC: NEGATIVE — SIGNIFICANT CHANGE UP
PH UR: 8 — SIGNIFICANT CHANGE UP (ref 5–8)
PLATELET # BLD AUTO: 188 K/UL — SIGNIFICANT CHANGE UP (ref 150–400)
POTASSIUM SERPL-MCNC: 4.1 MMOL/L — SIGNIFICANT CHANGE UP (ref 3.5–5.3)
POTASSIUM SERPL-SCNC: 4.1 MMOL/L — SIGNIFICANT CHANGE UP (ref 3.5–5.3)
PROT SERPL-MCNC: 6.5 G/DL — SIGNIFICANT CHANGE UP (ref 6–8.3)
PROT UR-MCNC: NEGATIVE — SIGNIFICANT CHANGE UP
PROTHROM AB SERPL-ACNC: 11.6 SEC — SIGNIFICANT CHANGE UP (ref 10–12.9)
RBC # BLD: 3.82 M/UL — SIGNIFICANT CHANGE UP (ref 3.8–5.2)
RBC # FLD: 11.9 % — SIGNIFICANT CHANGE UP (ref 10.3–14.5)
RBC CASTS # UR COMP ASSIST: 4 /HPF — SIGNIFICANT CHANGE UP (ref 0–4)
SODIUM SERPL-SCNC: 140 MMOL/L — SIGNIFICANT CHANGE UP (ref 135–145)
SP GR SPEC: 1.01 — SIGNIFICANT CHANGE UP (ref 1.01–1.02)
UROBILINOGEN FLD QL: NEGATIVE — SIGNIFICANT CHANGE UP
WBC # BLD: 6.2 K/UL — SIGNIFICANT CHANGE UP (ref 3.8–10.5)
WBC # FLD AUTO: 6.2 K/UL — SIGNIFICANT CHANGE UP (ref 3.8–10.5)
WBC UR QL: 2 /HPF — SIGNIFICANT CHANGE UP (ref 0–5)

## 2019-09-19 PROCEDURE — 80053 COMPREHEN METABOLIC PANEL: CPT

## 2019-09-19 PROCEDURE — 85610 PROTHROMBIN TIME: CPT

## 2019-09-19 PROCEDURE — 82330 ASSAY OF CALCIUM: CPT

## 2019-09-19 PROCEDURE — 82435 ASSAY OF BLOOD CHLORIDE: CPT

## 2019-09-19 PROCEDURE — 82947 ASSAY GLUCOSE BLOOD QUANT: CPT

## 2019-09-19 PROCEDURE — 84295 ASSAY OF SERUM SODIUM: CPT

## 2019-09-19 PROCEDURE — 85014 HEMATOCRIT: CPT

## 2019-09-19 PROCEDURE — 99284 EMERGENCY DEPT VISIT MOD MDM: CPT | Mod: 25

## 2019-09-19 PROCEDURE — 87086 URINE CULTURE/COLONY COUNT: CPT

## 2019-09-19 PROCEDURE — 85730 THROMBOPLASTIN TIME PARTIAL: CPT

## 2019-09-19 PROCEDURE — 93975 VASCULAR STUDY: CPT

## 2019-09-19 PROCEDURE — 82803 BLOOD GASES ANY COMBINATION: CPT

## 2019-09-19 PROCEDURE — 81001 URINALYSIS AUTO W/SCOPE: CPT

## 2019-09-19 PROCEDURE — 83605 ASSAY OF LACTIC ACID: CPT

## 2019-09-19 PROCEDURE — 87491 CHLMYD TRACH DNA AMP PROBE: CPT

## 2019-09-19 PROCEDURE — 76830 TRANSVAGINAL US NON-OB: CPT | Mod: 26

## 2019-09-19 PROCEDURE — 99284 EMERGENCY DEPT VISIT MOD MDM: CPT

## 2019-09-19 PROCEDURE — 76830 TRANSVAGINAL US NON-OB: CPT

## 2019-09-19 PROCEDURE — 84702 CHORIONIC GONADOTROPIN TEST: CPT

## 2019-09-19 PROCEDURE — 85027 COMPLETE CBC AUTOMATED: CPT

## 2019-09-19 PROCEDURE — 93975 VASCULAR STUDY: CPT | Mod: 26

## 2019-09-19 PROCEDURE — 81025 URINE PREGNANCY TEST: CPT

## 2019-09-19 PROCEDURE — 84132 ASSAY OF SERUM POTASSIUM: CPT

## 2019-09-19 NOTE — ED PROVIDER NOTE - PATIENT PORTAL LINK FT
You can access the FollowMyHealth Patient Portal offered by [facility] by registering at the following website: [email or web address]. By joining [employer]’s FollowMyHealth portal, you will also be able to view your health information using other applications (apps) compatible with our system.

## 2019-09-19 NOTE — ED PROVIDER NOTE - MUSCULOSKELETAL, MLM
Spine appears normal, range of motion is not limited, no muscle or joint tenderness. NO CVA tenderness.

## 2019-09-19 NOTE — ED PROVIDER NOTE - ATTENDING CONTRIBUTION TO CARE
Attending MD Downs:   I personally have seen and examined this patient.  Physician assistant note reviewed and agree on plan of care and except where noted.

## 2019-09-19 NOTE — ED PROVIDER NOTE - CLINICAL SUMMARY MEDICAL DECISION MAKING FREE TEXT BOX
Attending MD Downs: 29 yo healthy female presents with 3 days of suprapubic pain radiating to lower back, intermittant.  Denies vaginal discharge or urinary symptoms.  LMP ~ 9/15/19.  .  No hx of STDs.  Sexually active one partner.  No fevers, chills, nausea vomiting and diarrhea.  VSS.  Exam: lungs clear, heart RRR, suprapubic tenderness to palpation, no CVAT, pelvic with no discharge or CMT/adnexal tenderness to palpation   DDX:  UTI, ovarian pathology, TV sono rule out torsion, HCG/UCG, cbc, cmp, UA, urine culture.

## 2019-09-19 NOTE — ED ADULT NURSE NOTE - OBJECTIVE STATEMENT
29 yo f arrived to the ed c/o bl pelvic pain; pt reports hx of ovarian cysts; denies any fevers/chills/nvd

## 2019-09-19 NOTE — ED PROVIDER NOTE - OBJECTIVE STATEMENT
29 yo female PMhx IBS presents to the ED c/o pelvic/suprapubic pain x 3 days w/ radiation to lower back. 27 yo  female PMhx IBS and ovarian cysts presents to the ED c/o pelvic/suprapubic pain x 3 days w/ radiation to lower back. Pain is constant, felt in lower pelvic region. Has had similar pain in the past around her menstrual cycle, just finished most recent cycle yesterday which lasted 4 days. States has had irregular menstrual cycles over the last 6 months, has become more irregular the last 2 months, gets multiple periods per month the last 2 months. Is on combo OCP currently. Has not tried anything for the pain at home, feels different from her normal IBS cramping pain which is why she came to ED. Denies fever/chills, n/v/d, vaginal discharge, decreased appetite, hx abdominal surgery, chest pain, shortness of breath, constipation 29 yo  female PMhx IBS and ovarian cysts presents to the ED c/o pelvic/suprapubic pain x 3 days w/ radiation to lower back. Pain is constant, felt in lower pelvic region. Has had similar pain in the past around her menstrual cycle, just finished most recent cycle yesterday which lasted 4 days. States has had irregular menstrual cycles over the last 6 months, has become more irregular the last 2 months, gets multiple periods per month the last 2 months. Is on combo OCP currently. Has not tried anything for the pain at home, feels different from her normal IBS cramping pain which is why she came to ED. Sexually active with 1 male partner. No hx STDs. Denies fever/chills, n/v/d, vaginal discharge, decreased appetite, hx abdominal surgery, chest pain, shortness of breath, constipation, urinary urgency, frequency, dysuria, flank pain.

## 2019-09-19 NOTE — ED PROVIDER NOTE - ABDOMINAL TENDER
Abdomen is soft. +Mild ttp lower suprapubic region. No other abdominal ttp elicited. No McBurney's point tenderness. Negative Rovsing's, negative psoas. No CVA tenderness. Negative Leiva's.

## 2019-09-19 NOTE — ED PROVIDER NOTE - PROGRESS NOTE DETAILS
No acute pathology on TVUS. Labs non-actionable, UA noninfectious. Pt reassessed, still mild ttp low suprapubic but no RLQ or McBurney's point ttp, negative Rovsing's, negative psoas. No clinical signs concerning for other intra-abd pathology at this time. Discussed at length with pt and  all results and need to follow up with OBGYN as outpt. Counseled extensively on strict return precautions that should bring pt back to ED (vomiting, localization of pain, worsening of pain, decreased appetite, fever/chills, etc.). pt and family acknowledged understanding, feel comfortable going home with outpt f/u. All questions answered. Pt stable for discharge. - Jose Daniel Nguyen PA-C

## 2019-09-20 LAB
C TRACH RRNA SPEC QL NAA+PROBE: SIGNIFICANT CHANGE UP
CULTURE RESULTS: SIGNIFICANT CHANGE UP
N GONORRHOEA RRNA SPEC QL NAA+PROBE: SIGNIFICANT CHANGE UP
SPECIMEN SOURCE: SIGNIFICANT CHANGE UP

## 2020-01-08 ENCOUNTER — TRANSCRIPTION ENCOUNTER (OUTPATIENT)
Age: 30
End: 2020-01-08

## 2020-02-12 ENCOUNTER — TRANSCRIPTION ENCOUNTER (OUTPATIENT)
Age: 30
End: 2020-02-12

## 2020-07-21 ENCOUNTER — TRANSCRIPTION ENCOUNTER (OUTPATIENT)
Age: 30
End: 2020-07-21

## 2021-05-29 ENCOUNTER — TRANSCRIPTION ENCOUNTER (OUTPATIENT)
Age: 31
End: 2021-05-29

## 2021-07-21 ENCOUNTER — EMERGENCY (EMERGENCY)
Facility: HOSPITAL | Age: 31
LOS: 1 days | Discharge: HOME | End: 2021-07-21
Attending: EMERGENCY MEDICINE
Payer: COMMERCIAL

## 2021-07-21 VITALS
RESPIRATION RATE: 18 BRPM | WEIGHT: 115.08 LBS | SYSTOLIC BLOOD PRESSURE: 122 MMHG | OXYGEN SATURATION: 99 % | HEART RATE: 68 BPM | TEMPERATURE: 99 F | HEIGHT: 61 IN | DIASTOLIC BLOOD PRESSURE: 72 MMHG

## 2021-07-21 DIAGNOSIS — Z90.89 ACQUIRED ABSENCE OF OTHER ORGANS: Chronic | ICD-10-CM

## 2021-07-21 PROCEDURE — 13132 CMPLX RPR F/C/C/M/N/AX/G/H/F: CPT

## 2021-07-21 PROCEDURE — 99283 EMERGENCY DEPT VISIT LOW MDM: CPT | Mod: 25

## 2021-07-21 PROCEDURE — 12002 RPR S/N/AX/GEN/TRNK2.6-7.5CM: CPT

## 2021-07-21 PROCEDURE — 73140 X-RAY EXAM OF FINGER(S): CPT

## 2021-07-21 PROCEDURE — 73140 X-RAY EXAM OF FINGER(S): CPT | Mod: 26,RT

## 2021-07-21 RX ORDER — ACETAMINOPHEN 500 MG
650 TABLET ORAL ONCE
Refills: 0 | Status: COMPLETED | OUTPATIENT
Start: 2021-07-21 | End: 2021-07-21

## 2021-07-21 RX ORDER — IBUPROFEN 200 MG
600 TABLET ORAL ONCE
Refills: 0 | Status: DISCONTINUED | OUTPATIENT
Start: 2021-07-21 | End: 2021-07-21

## 2021-07-21 RX ORDER — LIDOCAINE HCL 20 MG/ML
5 VIAL (ML) INJECTION ONCE
Refills: 0 | Status: COMPLETED | OUTPATIENT
Start: 2021-07-21 | End: 2021-07-21

## 2021-07-21 RX ADMIN — Medication 5 MILLILITER(S): at 14:46

## 2021-07-21 RX ADMIN — Medication 650 MILLIGRAM(S): at 14:42

## 2021-07-21 NOTE — ED PROVIDER NOTE - PATIENT PORTAL LINK FT
You can access the FollowMyHealth Patient Portal offered by [facility] by registering at the following website: [email or web address]. By joining [employer]’s FollowMyHealth portal, you will also be able to view your health information using other applications (apps) compatible with our system. You can access the FollowMyHealth Patient Portal offered by [facility] by registering at the following website: [email or web address]. By joining [employer]’s FollowMyHealth portal, you will also be able to view your health information using other applications (apps) compatible with our system.

## 2021-07-21 NOTE — ED PROVIDER NOTE - PRINCIPAL DIAGNOSIS
Laceration of right ring finger without foreign body without damage to nail, initial encounter Finger laceration, initial encounter

## 2021-07-21 NOTE — ED PROVIDER NOTE - MUSCULOSKELETAL, MLM
No decreased range of motion in adduction, abduction, flexion or extension of bilateral digits. Decreased intact in radial, median, and ulnar distributions of bilateral hands

## 2021-07-21 NOTE — ED ADULT NURSE NOTE - OBJECTIVE STATEMENT
29 y/o female no pmh. Pt's right ring finger was closed in car door and pulled it out, resulting in a laceration approx 3 cm in width, with mild bleeding, gauze dressing in place. Tetanus UTD. Pt is A&Ox4, airway patent. No C/P, SOB, N/V/D. Ambulatory with no distress. Radial pulses strong BL.

## 2021-07-21 NOTE — ED PROVIDER NOTE - CARE PLAN
Principal Discharge DX:	Laceration of right ring finger without foreign body without damage to nail, initial encounter   Principal Discharge DX:	Finger laceration, initial encounter

## 2021-07-21 NOTE — ED PROVIDER NOTE - ATTENDING CONTRIBUTION TO CARE
Attending MD Goode: I personally have seen and examined this patient.  Fellow note reviewed and agree on plan of care and except where noted.  See below for details.     Seen in [place]    30F with no contributor PMH/PSH presents to the ED with pain at the R finger after slamming it in car door.  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  Reports attempted to pull finger out while door was still closed.  Reports pain at distal aspect of finger where she has a laceration.  Reports sensory and motor intact.  Reports tetanus UTD.  Denies other complaints.    Exam:   General: NAD  HENT: head NCAT, airway patent   Eyes: PERRL  Lungs: symmetric chest rise, no increased work of breathing  Circ: +2 radial on R, cap refill <2s  MSK: FROM at neck, +FROM at R index finger including MCP, PIP, DIP  Neuro: moving all extremities with 5/5 strength bilateral upper and lower extremities, sensory grossly intact, no gross neuro deficits  Psych: normal mood and affect   Skin: laceration 3cm at R finger distal tip    A/P: 30F with R ring finger laceration, will obtain XR for fx, repair, tet UTD

## 2021-07-21 NOTE — ED PROVIDER NOTE - NSFOLLOWUPINSTRUCTIONS_ED_ALL_ED_FT
Your were seen in the ER for a finger injury. Your x-ray did not show a fracture. We repaired the cut on your finger with sutures.     Please follow-up with your PCP or an Urgent care in 5-7 days to have your stitches removed.    Please take your Tylenol 650mg or Ibuprofen 600mg every 6  hours as needed for pain. Your last dose of Tylenol was at 2pm today.    Please return to the ED if you develop fever, foul-smelling discharge from the cut, redness around the cut or pain.

## 2021-07-21 NOTE — ED PROVIDER NOTE - OBJECTIVE STATEMENT
Please return to the Emergency Department for any new or worsening symptoms including: difficulty swallowing, fever, chest pain, shortness of breath, loss of consciousness, dizziness, weakness, or any other concerns.     Please follow up with your Primary Care Doctor in 2-3 days for a recheck of your symptoms. If you do not have one, you may contact the one listed on your discharge paperwork or you may also call the [facility] Appointment Desk at [phone] to schedule an appointment with one.     Tylenol or Ibuprofen as needed for pain.     
Pt who is R hand dominant accidentally slammed her R ring finger in a car door. Pt attempted to pull finger out before opening door. Pt endorses pain at the distal phalanx of her R ring finger. Denies numbness or tingling. Pt had a tetanus shot last year.

## 2021-07-21 NOTE — ED PROVIDER NOTE - NS ED MD EM SELECTION
44414 Problem Focused - Low complexity 62600 Exp Problem Focused - Mod. Complex 82830 Problem Focused - Low complexity 90668 Exp Problem Focused - Mod. Complex

## 2021-07-21 NOTE — ED PROVIDER NOTE - CLINICAL SUMMARY MEDICAL DECISION MAKING FREE TEXT BOX
Given trama to distal finger will obtain x-ray to evaluate for fracture. Will give Tylenol for pain and repair laceration. Sensation and motor intact so no concern fro neurovascular injury.

## 2021-07-23 ENCOUNTER — EMERGENCY (EMERGENCY)
Facility: HOSPITAL | Age: 31
LOS: 0 days | Discharge: HOME | End: 2021-07-23
Attending: EMERGENCY MEDICINE
Payer: COMMERCIAL

## 2021-07-23 VITALS
RESPIRATION RATE: 18 BRPM | DIASTOLIC BLOOD PRESSURE: 62 MMHG | TEMPERATURE: 98 F | WEIGHT: 115.08 LBS | HEIGHT: 61 IN | OXYGEN SATURATION: 99 % | HEART RATE: 61 BPM | SYSTOLIC BLOOD PRESSURE: 108 MMHG

## 2021-07-23 DIAGNOSIS — Z48.02 ENCOUNTER FOR REMOVAL OF SUTURES: ICD-10-CM

## 2021-07-23 DIAGNOSIS — Z90.89 ACQUIRED ABSENCE OF OTHER ORGANS: Chronic | ICD-10-CM

## 2021-07-23 PROCEDURE — 99283 EMERGENCY DEPT VISIT LOW MDM: CPT

## 2021-07-23 PROCEDURE — G0463: CPT

## 2021-07-23 RX ORDER — CEPHALEXIN 500 MG
500 CAPSULE ORAL EVERY 12 HOURS
Refills: 0 | Status: DISCONTINUED | OUTPATIENT
Start: 2021-07-23 | End: 2021-07-23

## 2021-07-23 RX ORDER — CEPHALEXIN 500 MG
1 CAPSULE ORAL
Qty: 10 | Refills: 0
Start: 2021-07-23 | End: 2021-07-27

## 2021-07-23 RX ADMIN — Medication 500 MILLIGRAM(S): at 11:15

## 2021-07-23 NOTE — ED STATDOCS - SKIN, MLM
skin normal color for race, warm, dry and intact. 3 cm laceratio nto the R 4th digit lateral to the nail. no active bleeding. has 2 sutures in place. 1 suture appears to be dehissing and 2 other missing sutures centrally. wound has dehissed. neurovascularly intact. 3 cm laceration to the R 4th digit lateral to the nail. No active bleeding. Has 2 sutures in place. 1 suture appears to be de-hissing and 2 other missing sutures centrally. Wound has de-hissed. Neurovascularly intact.

## 2021-07-23 NOTE — ED STATDOCS - CLINICAL SUMMARY MEDICAL DECISION MAKING FREE TEXT BOX
plan: removal of remaining loose stich and will stereo strip. Plan: removal of remaining loose stich and will stereo strip. Plan: removal of remaining loose stich and will steri strip.  Closure would increase risk for infection.  NVI.  Will start on prophylactic keflex.  Pt well appearing.  D/c home with strict return precautions and prompt outpatient f/u.

## 2021-07-23 NOTE — ED ADULT NURSE NOTE - OBJECTIVE STATEMENT
Pt c/o wound check to right ring finger. pt had stiches placed two days ago for a laceration, today skin opened up. Denies drainage to area. initial injury was from a car door.

## 2021-07-23 NOTE — ED ADULT TRIAGE NOTE - CHIEF COMPLAINT QUOTE
Pt c/o wound check to right ring finger. pt had stiches placed two days ago for a laceration, today skin opened up. Denies drainage to area.

## 2021-07-23 NOTE — ED ADULT NURSE NOTE - NSIMPLEMENTINTERV_GEN_ALL_ED
Implemented All Universal Safety Interventions:  [place] to call system. Call bell, personal items and telephone within reach. Instruct patient to call for assistance. Room bathroom lighting operational. Non-slip footwear when patient is off stretcher. Physically safe environment: no spills, clutter or unnecessary equipment. Stretcher in lowest position, wheels locked, appropriate side rails in place.

## 2021-07-23 NOTE — ED STATDOCS - PATIENT PORTAL LINK FT
You can access the FollowMyHealth Patient Portal offered by [facility] by registering at the following website: [email or web address]. By joining [employer]’s FollowMyHealth portal, you will also be able to view your health information using other applications (apps) compatible with our system.

## 2021-07-23 NOTE — ED STATDOCS - OBJECTIVE STATEMENT
31 y/o F with a PMHx of IBS presents to the ED for evaluation of a wound check to R ring finger. Pt had stiches placed 2 days ago for a laceration and states some stiches have come out. UTD tetanus. c/o bleeding around the area. No drainage. No pus. 29 y/o F with a PMHx of IBS presents to the ED for evaluation of a wound check to R ring finger. Pt had stiches placed 2 days ago for a laceration and states some stiches have come out. No drainage. No pus. UTD tetanus.

## 2021-07-23 NOTE — ED PROCEDURE NOTE - PROCEDURE ADDITIONAL DETAILS
snehal: 3/5 sutures from prior repair open, laceration site open. 3 sutures removed, steristrips placed over laceration given length of time since initial laceration, not warranting further suture repair at this time.

## 2021-09-28 NOTE — ED PROVIDER NOTE - NSFOLLOWUPINSTRUCTIONS_ED_ALL_ED_FT
1. Follow up with your PMD in 1-2 days. Additionally please follow up with either your OBGYN or our OBGYN clinic ([phone]) in 2-3 days for further evaluation/management regarding your symptoms. Please bring a copy of all your results with you to your appointments.   2. Rest, stay hydrated. Continue all current home medications as previously prescribed. Take Motrin 600mg every 8 hours with food for pain.  3. Return to the ED immediately for any new/worsening symptoms including worsening pain, fever/chills, nausea/vomiting, diarrhea, abdominal pain, vaginal bleeding, or any other concerning symptoms.
185.42

## 2021-10-07 ENCOUNTER — TRANSCRIPTION ENCOUNTER (OUTPATIENT)
Age: 31
End: 2021-10-07

## 2022-04-24 ENCOUNTER — EMERGENCY (EMERGENCY)
Facility: HOSPITAL | Age: 32
LOS: 1 days | Discharge: HOME | End: 2022-04-24
Attending: EMERGENCY MEDICINE
Payer: COMMERCIAL

## 2022-04-24 VITALS
OXYGEN SATURATION: 100 % | SYSTOLIC BLOOD PRESSURE: 133 MMHG | HEART RATE: 65 BPM | DIASTOLIC BLOOD PRESSURE: 85 MMHG | RESPIRATION RATE: 18 BRPM

## 2022-04-24 VITALS
HEIGHT: 61 IN | HEART RATE: 88 BPM | OXYGEN SATURATION: 98 % | TEMPERATURE: 99 F | WEIGHT: 115.08 LBS | RESPIRATION RATE: 16 BRPM | SYSTOLIC BLOOD PRESSURE: 108 MMHG | DIASTOLIC BLOOD PRESSURE: 78 MMHG

## 2022-04-24 DIAGNOSIS — Z90.89 ACQUIRED ABSENCE OF OTHER ORGANS: Chronic | ICD-10-CM

## 2022-04-24 PROCEDURE — 99283 EMERGENCY DEPT VISIT LOW MDM: CPT | Mod: 25

## 2022-04-24 PROCEDURE — 73030 X-RAY EXAM OF SHOULDER: CPT | Mod: 26,LT

## 2022-04-24 PROCEDURE — 73030 X-RAY EXAM OF SHOULDER: CPT

## 2022-04-24 PROCEDURE — 99284 EMERGENCY DEPT VISIT MOD MDM: CPT

## 2022-04-24 NOTE — ED PROVIDER NOTE - PHYSICAL EXAMINATION
On Physical Exam:  General: well appearing, in NAD, speaking clearly in full sentences and without difficulty; cooperative with exam  HEENT: PERRL, MMM/airway patent  Neck: no neck tenderness, no nuchal rigidity  Back: no midline tenderness  Abdomen: soft nontender/nondistended  : no bladder tenderness or distension  Skin: intact, no rash  Extremities: no peripheral edema, no gross deformities; FROM of shoulders  Neuro: no gross neurologic deficits

## 2022-04-24 NOTE — ED PROVIDER NOTE - NS ED ROS FT
Review of Systems:  -Pulmonary: no shortness of breath  -Cardiac: no chest pain  -Gastrointestinal: no abdominal pain  -Musculoskeletal: see HPI  -Skin: no rashes  -Endocrine: No h/o diabetes   -Neurologic: No new weakness or numbness in extremities    All else negative unless otherwise specified elsewhere in this note.

## 2022-04-24 NOTE — ED PROVIDER NOTE - PATIENT PORTAL LINK FT
You can access the FollowMyHealth Patient Portal offered by [facility] by registering at the following website: [email or web address]. By joining [employer]’s FollowMyHealth portal, you will also be able to view your health information using other applications (apps) compatible with our system.
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
## 2022-04-24 NOTE — ED PROVIDER NOTE - SECONDARY DIAGNOSIS.
Well-Baby Checkup: 2 Months  At the 2-month checkup, the healthcare provider will examine the baby and ask how things are going at home. This sheet describes some of what you can expect.      Development and milestones  The healthcare provider will ask qu poops even less often than every 2 to 3 days if the baby is otherwise healthy. But if the baby also becomes fussy, spits up more than normal, eats less than normal, or has very hard stool, tell the healthcare provider.  The baby may be constipated (unable t crib. These could suffocate the baby. · Swaddling means wrapping your  baby snugly in a blanket, but with enough space so he or she can move hips and legs. Swaddling can help the baby feel safe and fall asleep.  You can buy a special swaddling blank for the baby's first year, if possible. But you should do it for at least the first 6 months. · Always put cribs, bassinets, and play yards in areas with no hazards. This means no dangling cords, wires, or window coverings.  This will lower the risk for st tetanus, and pertussis  · Haemophilus influenzae type b  · Hepatitis B  · Pneumococcus  · Polio  · Rotavirus  Vaccines help keep your baby healthy  Vaccines (also called immunizations) help a baby’s body build up defenses against serious diseases.  Having y the following vaccines:     Pediarix, Prevnar, HIB and Rotateq vaccines.      Tylenol/Acetaminophen Dosing    Please dose every 4 hours as needed,do not give more than 5 doses in any 24 hour period  Dosing should be done on a dose/weight basis  Infant Oral baby. Rupal Lees not place your baby in the front passenger seat - this is a dangerous place even if you do not have air bags. Your child should always be in the back seat facing backwards until she is 3years old.    she should never be in the front seat until s MVC (motor vehicle collision)
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
## 2022-06-10 ENCOUNTER — NON-APPOINTMENT (OUTPATIENT)
Age: 32
End: 2022-06-10

## 2022-06-26 NOTE — ED ADULT NURSE NOTE - PRO INTERPRETER NEED 2
Bed: 04A  Expected date: 6/26/22  Expected time:   Means of arrival:   Comments:  Triage: Deanna Martinez  
English

## 2022-07-22 ENCOUNTER — APPOINTMENT (OUTPATIENT)
Dept: OBGYN | Facility: CLINIC | Age: 32
End: 2022-07-22

## 2022-07-22 VITALS
HEIGHT: 61 IN | DIASTOLIC BLOOD PRESSURE: 72 MMHG | SYSTOLIC BLOOD PRESSURE: 106 MMHG | BODY MASS INDEX: 21.71 KG/M2 | WEIGHT: 115 LBS

## 2022-07-22 DIAGNOSIS — E28.2 POLYCYSTIC OVARIAN SYNDROME: ICD-10-CM

## 2022-07-22 DIAGNOSIS — Z01.419 ENCOUNTER FOR GYNECOLOGICAL EXAMINATION (GENERAL) (ROUTINE) W/OUT ABNORMAL FINDINGS: ICD-10-CM

## 2022-07-22 PROCEDURE — 36415 COLL VENOUS BLD VENIPUNCTURE: CPT

## 2022-07-22 PROCEDURE — 99385 PREV VISIT NEW AGE 18-39: CPT

## 2022-07-22 RX ORDER — SERTRALINE 25 MG/1
25 TABLET, FILM COATED ORAL
Qty: 30 | Refills: 5 | Status: DISCONTINUED | COMMUNITY
Start: 2019-07-11 | End: 2022-07-22

## 2022-07-22 NOTE — END OF VISIT
[FreeTextEntry3] : I, Ketty Hayes, acted as a scribe on behalf of Kat Tavarez NP on 07/22/2022.\par \par All medical entries made by the scribe were at my, Kat Tavarez NP, direction and personally dictated by me on 07/22/2022 . I have reviewed the chart and agree that the record accurately reflects my personal performance of the history, physical exam, assessment and plan. I have also personally directed, reviewed, and agreed with the chart.

## 2022-07-22 NOTE — REVIEW OF SYSTEMS
[Negative] : Heme/Lymph [Patient Intake Form Reviewed] : Patient intake form was reviewed [FreeTextEntry8] : longer menstrual cycles q30-40 days

## 2022-07-22 NOTE — HISTORY OF PRESENT ILLNESS
[Y] : Patient reports abnormal menses [Irregular Cycle Intervals] : are  irregular [Regular Duration] : has been regular [Bleeding Usually Lasts ___ Days] : usually last [unfilled] days [N] : Patient denies prior pregnancies [Frequency: Q ___ days] : menstrual periods occur approximately every [unfilled] days [Currently Active] : currently active [Men] : men [No] : No [Condoms] : Condoms [Patient would like to be screened for STIs] : Patient would like to be screened for STIs [LMPDate] : 6/25/22 [FreeTextEntry1] : 6/25/22

## 2022-07-22 NOTE — COUNSELING
[Nutrition/ Exercise/ Weight Management] : nutrition, exercise, weight management [Vitamins/Supplements] : vitamins/supplements [Breast Self Exam] : breast self exam [Contraception/ Emergency Contraception/ Safe Sexual Practices] : contraception, emergency contraception, safe sexual practices [Preconception Care/ Fertility options] : preconception care, fertility options [Confidentiality] : confidentiality [STD (testing, results, tx)] : STD (testing, results, tx) [HPV Vaccine] : HPV Vaccine [Lab Results] : lab results [Medication Management] : medication management [Other ___] : [unfilled]

## 2022-07-22 NOTE — PHYSICAL EXAM
[Appropriately responsive] : appropriately responsive [Alert] : alert [No Acute Distress] : no acute distress [No Lymphadenopathy] : no lymphadenopathy [Soft] : soft [Non-tender] : non-tender [Non-distended] : non-distended [No HSM] : No HSM [No Lesions] : no lesions [No Mass] : no mass [Oriented x3] : oriented x3 [Examination Of The Breasts] : a normal appearance [No Masses] : no breast masses were palpable [Labia Majora] : normal [Labia Minora] : normal [Normal] : normal [Uterine Adnexae] : normal [Chaperone Declined] : Patient declined chaperone [No Bleeding] : There was no active vaginal bleeding

## 2022-07-22 NOTE — PLAN
[FreeTextEntry1] : 30 y/o G0 /22 for new opt GYN exam and establish care. \par \par HCM\par - Discussed and reviewed importance of monthly BSE\par - Pap test w/ HPV and GCE collected and sent at today's visit \par - Vit D/Calcium/exercise\par - PCOS labs today\par - Starting Folic Acid 400 mcg daily\par - Continue Inositol for PCOS\par - Discussed using ovulation apps, timed intercourse, ovulation prediction kits\par - f/u PCP for recommended HCM, vaccinations and CA screening\par - RTO 1 year or sooner if needed

## 2022-07-25 LAB
C TRACH RRNA SPEC QL NAA+PROBE: NOT DETECTED
ESTRADIOL SERPL-MCNC: 116 PG/ML
FSH SERPL-MCNC: 4 IU/L
HBV SURFACE AG SER QL: NONREACTIVE
HCV AB SER QL: NONREACTIVE
HCV S/CO RATIO: 0.14 S/CO
HIV1+2 AB SPEC QL IA.RAPID: NONREACTIVE
HPV HIGH+LOW RISK DNA PNL CVX: NOT DETECTED
LH SERPL-ACNC: 4 IU/L
N GONORRHOEA RRNA SPEC QL NAA+PROBE: NOT DETECTED
SOURCE TP AMPLIFICATION: NORMAL
T PALLIDUM AB SER QL IA: NEGATIVE
TSH SERPL-ACNC: 1.68 UIU/ML

## 2022-07-28 LAB
CYTOLOGY CVX/VAG DOC THIN PREP: NORMAL
DHEA-SULFATE, SERUM: 87 UG/DL

## 2022-11-25 ENCOUNTER — APPOINTMENT (OUTPATIENT)
Dept: OBGYN | Facility: CLINIC | Age: 32
End: 2022-11-25

## 2022-11-28 ENCOUNTER — APPOINTMENT (OUTPATIENT)
Dept: OBGYN | Facility: CLINIC | Age: 32
End: 2022-11-28

## 2022-11-28 VITALS
SYSTOLIC BLOOD PRESSURE: 106 MMHG | WEIGHT: 117 LBS | BODY MASS INDEX: 22.09 KG/M2 | HEIGHT: 61 IN | DIASTOLIC BLOOD PRESSURE: 51 MMHG

## 2022-11-28 DIAGNOSIS — N64.4 MASTODYNIA: ICD-10-CM

## 2022-11-28 DIAGNOSIS — Z13.1 ENCOUNTER FOR SCREENING FOR DIABETES MELLITUS: ICD-10-CM

## 2022-11-28 DIAGNOSIS — Z11.3 ENCOUNTER FOR SCREENING FOR INFECTIONS WITH A PREDOMINANTLY SEXUAL MODE OF TRANSMISSION: ICD-10-CM

## 2022-11-28 LAB
25(OH)D3 SERPL-MCNC: 35.4 NG/ML
BASOPHILS # BLD AUTO: 0.03 K/UL
BASOPHILS NFR BLD AUTO: 0.4 %
EOSINOPHIL # BLD AUTO: 0.03 K/UL
EOSINOPHIL NFR BLD AUTO: 0.4 %
ESTIMATED AVERAGE GLUCOSE: 100 MG/DL
HBA1C MFR BLD HPLC: 5.1 %
HBV SURFACE AG SER QL: NONREACTIVE
HCT VFR BLD CALC: 39.4 %
HCV AB SER QL: NONREACTIVE
HCV S/CO RATIO: 0.16 S/CO
HGB BLD-MCNC: 13.3 G/DL
HIV1+2 AB SPEC QL IA.RAPID: NONREACTIVE
IMM GRANULOCYTES NFR BLD AUTO: 0.2 %
LYMPHOCYTES # BLD AUTO: 1.66 K/UL
LYMPHOCYTES NFR BLD AUTO: 20.4 %
MAN DIFF?: NORMAL
MCHC RBC-ENTMCNC: 31.4 PG
MCHC RBC-ENTMCNC: 33.8 GM/DL
MCV RBC AUTO: 92.9 FL
MONOCYTES # BLD AUTO: 0.71 K/UL
MONOCYTES NFR BLD AUTO: 8.7 %
NEUTROPHILS # BLD AUTO: 5.69 K/UL
NEUTROPHILS NFR BLD AUTO: 69.9 %
PLATELET # BLD AUTO: 226 K/UL
RBC # BLD: 4.24 M/UL
RBC # FLD: 12.3 %
TSH SERPL-ACNC: 2.09 UIU/ML
WBC # FLD AUTO: 8.14 K/UL

## 2022-11-28 PROCEDURE — 36415 COLL VENOUS BLD VENIPUNCTURE: CPT

## 2022-11-28 PROCEDURE — 99214 OFFICE O/P EST MOD 30 MIN: CPT | Mod: 25

## 2022-11-28 PROCEDURE — 76817 TRANSVAGINAL US OBSTETRIC: CPT

## 2022-11-28 RX ORDER — FAMOTIDINE 20 MG
TABLET ORAL
Refills: 0 | Status: ACTIVE | COMMUNITY

## 2022-11-28 RX ORDER — CHOLINE 650 MG
TABLET ORAL
Refills: 0 | Status: DISCONTINUED | COMMUNITY
End: 2022-11-28

## 2022-11-28 NOTE — PHYSICAL EXAM
[FreeTextEntry1] :  present [Appropriately responsive] : appropriately responsive [Alert] : alert [No Acute Distress] : no acute distress [Soft] : soft [Non-tender] : non-tender [Non-distended] : non-distended [No HSM] : No HSM [No Lesions] : no lesions [No Mass] : no mass [Oriented x3] : oriented x3 [Examination Of The Breasts] : a normal appearance [No Masses] : no breast masses were palpable [Labia Majora] : normal [Labia Minora] : normal [Normal] : normal [Uterine Adnexae] : normal

## 2022-11-28 NOTE — PLAN
[FreeTextEntry1] : SECONDARY AMENORRHEA/EARLY IUP\par -TVUS today shows early IUP measuring 7w2d, 8w0d by lmp  dating. Approx PIERCE of 7/10/2023 by lmp 10/3/22\par - Early pregnancy precautions, practice /hospital info and folder provided to patient. \par -NOB blood panel, GCC cx, UCX, collected and sent at todays office visit. \par -Cont PNVs\par \par GENETICS\par -EZEKIEL expanded carrier screen drawn and sent at todays\par \par Discussed nausea and constipation relief\par Declines flu vaccine\par Counseled on Covid vaccine; declines. no past covid vaccinations. \par \par RTO 4 weeks for NOB/NT sono/NIPS. \par \par During this visit 30 minutes were spent face-to-face with greater than 50% of this time dedicated to counseling.\par \par \par

## 2022-11-28 NOTE — REVIEW OF SYSTEMS
[Patient Intake Form Reviewed] : Patient intake form was reviewed [Constipation] : constipation [Nausea] : nausea [Negative] : Heme/Lymph [de-identified] : tenderness

## 2022-11-28 NOTE — COUNSELING
[Nutrition/ Exercise/ Weight Management] : nutrition, exercise, weight management [Vitamins/Supplements] : vitamins/supplements [Drugs/Alcohol] : drugs, alcohol [Contraception/ Emergency Contraception/ Safe Sexual Practices] : contraception, emergency contraception, safe sexual practices [Preconception Care/ Fertility options] : preconception care, fertility options [Confidentiality] : confidentiality [STD (testing, results, tx)] : STD (testing, results, tx) [Vaccines] : vaccines [Influenza Vaccine] : influenza vaccine [Lab Results] : lab results [Medication Management] : medication management [Other ___] : [unfilled]

## 2022-11-28 NOTE — HISTORY OF PRESENT ILLNESS
[Patient reported PAP Smear was normal] : Patient reported PAP Smear was normal [FreeTextEntry1] : 30 y/o G0 LMP 10/3/2022 presents to office for amenorrhea visit and had positive urine home pregnancy tests earlier this month. c/o nausea, breast tenderness and constipation. Last GYN exam 7/2022. pap NILM at that time. \par Planned and desired pregnancy. \par Reports menses q 30-32 days apart. Hx PCOS was taking inositol, no longer taking. only medication is PNVs. \par \par Colonoscopy 3/2019\par Sexually active; no using contraception\par Menarche age 16, menses every 30-40 days, lasting 5-6 days in duration; pt reports hx of PCOS; longer cycles; had pelvic sono 2019 imaging c/w polycystic ovaries otherwise normal. \par Hx of ovarian cysts and endometriosis? per pt hx. \par Allergy to Amoxicillin - causes rash. \par Pt has IBS, takes Magnesium at night for symptomatic relief.\par \par Employed with [employer]; .\par  [PapSmeardate] : 7/22/22

## 2022-11-28 NOTE — PROCEDURE
[GC Chlamydia Culture] : GC Chlamydia Culture [Tolerated Well] : the patient tolerated the procedure well [No Complications] : there were no complications [Transvaginal OB Sonogram] : Transvaginal OB Sonogram [Intrauterine Pregnancy] : intrauterine pregnancy [Yolk Sac] : yolk sac present [Fetal Heart] : fetal heart present [Date: ___] : Date: [unfilled] [Current GA by Sonogram: ___ (wks)] : Current GA by Sonogram: [unfilled]Uwks [___ day(s)] : [unfilled] days [Transvaginal OB Sonogram WNL] : Transvaginal OB Sonogram WNL [FreeTextEntry1] : +FHR, CRL 7w2d, 8w0d by lmp dating

## 2022-11-29 LAB
ABO + RH PNL BLD: NORMAL
BLD GP AB SCN SERPL QL: NORMAL
C TRACH RRNA SPEC QL NAA+PROBE: NOT DETECTED
HGB A MFR BLD: 97.2 %
HGB A2 MFR BLD: 2.8 %
HGB FRACT BLD-IMP: NORMAL
LEAD BLD-MCNC: <1 UG/DL
MEV IGG FLD QL IA: 18.2 AU/ML
MEV IGG+IGM SER-IMP: POSITIVE
MUV AB SER-ACNC: POSITIVE
MUV IGG SER QL IA: 30.6 AU/ML
N GONORRHOEA RRNA SPEC QL NAA+PROBE: NOT DETECTED
RUBV IGG FLD-ACNC: 2.7 INDEX
RUBV IGG SER-IMP: POSITIVE
SOURCE AMPLIFICATION: NORMAL
T PALLIDUM AB SER QL IA: NEGATIVE
VZV AB TITR SER: POSITIVE
VZV IGG SER IF-ACNC: 288.1 INDEX

## 2022-11-30 LAB — BACTERIA UR CULT: NORMAL

## 2022-12-02 LAB
B19V IGG SER QL IA: 6.61 INDEX
B19V IGG+IGM SER-IMP: NORMAL
B19V IGG+IGM SER-IMP: POSITIVE
B19V IGM FLD-ACNC: 0.18 INDEX
B19V IGM SER-ACNC: NEGATIVE

## 2022-12-12 ENCOUNTER — TRANSCRIPTION ENCOUNTER (OUTPATIENT)
Age: 32
End: 2022-12-12

## 2022-12-29 ENCOUNTER — ASOB RESULT (OUTPATIENT)
Age: 32
End: 2022-12-29

## 2022-12-29 ENCOUNTER — APPOINTMENT (OUTPATIENT)
Dept: OBGYN | Facility: CLINIC | Age: 32
End: 2022-12-29
Payer: COMMERCIAL

## 2022-12-29 PROCEDURE — 76813 OB US NUCHAL MEAS 1 GEST: CPT

## 2022-12-29 PROCEDURE — 36415 COLL VENOUS BLD VENIPUNCTURE: CPT

## 2022-12-29 PROCEDURE — 0500F INITIAL PRENATAL CARE VISIT: CPT

## 2022-12-29 PROCEDURE — 76801 OB US < 14 WKS SINGLE FETUS: CPT

## 2023-01-26 ENCOUNTER — NON-APPOINTMENT (OUTPATIENT)
Age: 33
End: 2023-01-26

## 2023-01-26 ENCOUNTER — APPOINTMENT (OUTPATIENT)
Dept: OBGYN | Facility: CLINIC | Age: 33
End: 2023-01-26
Payer: COMMERCIAL

## 2023-01-26 PROCEDURE — 99395 PREV VISIT EST AGE 18-39: CPT

## 2023-01-26 PROCEDURE — 36415 COLL VENOUS BLD VENIPUNCTURE: CPT

## 2023-01-29 LAB
AFP MOM: 0.52
AFP VALUE: 22.2 NG/ML
ALPHA FETOPROTEIN SERUM COMMENT: NORMAL
ALPHA FETOPROTEIN SERUM INTERPRETATION: NORMAL
ALPHA FETOPROTEIN SERUM RESULTS: NORMAL
ALPHA FETOPROTEIN SERUM TEST RESULTS: NORMAL
BACTERIA UR CULT: NORMAL
GESTATIONAL AGE BASED ON: NORMAL
GESTATIONAL AGE ON COLLECTION DATE: 16.4 WEEKS
INSULIN DEP DIABETES: NO
MATERNAL AGE AT EDD AFP: 31.5 YR
MULTIPLE GESTATION: NO
OSBR RISK 1 IN: NORMAL
RACE: NORMAL
WEIGHT AFP: 121 LBS

## 2023-02-07 LAB
APPEARANCE: CLEAR
BACTERIA: NEGATIVE
BILIRUBIN URINE: NEGATIVE
BLOOD URINE: NEGATIVE
COLOR: NORMAL
GLUCOSE QUALITATIVE U: NEGATIVE
HYALINE CASTS: 2 /LPF
KETONES URINE: NEGATIVE
LEUKOCYTE ESTERASE URINE: NEGATIVE
MICROSCOPIC-UA: NORMAL
NITRITE URINE: NEGATIVE
PH URINE: 7
PROTEIN URINE: NEGATIVE
RED BLOOD CELLS URINE: 1 /HPF
SPECIFIC GRAVITY URINE: 1.01
SQUAMOUS EPITHELIAL CELLS: 3 /HPF
UROBILINOGEN URINE: NORMAL
WHITE BLOOD CELLS URINE: 1 /HPF

## 2023-02-14 ENCOUNTER — TRANSCRIPTION ENCOUNTER (OUTPATIENT)
Age: 33
End: 2023-02-14

## 2023-02-14 LAB — BACTERIA UR CULT: NORMAL

## 2023-02-15 ENCOUNTER — NON-APPOINTMENT (OUTPATIENT)
Age: 33
End: 2023-02-15

## 2023-02-15 ENCOUNTER — APPOINTMENT (OUTPATIENT)
Dept: OBGYN | Facility: CLINIC | Age: 33
End: 2023-02-15
Payer: COMMERCIAL

## 2023-02-15 PROCEDURE — 0502F SUBSEQUENT PRENATAL CARE: CPT

## 2023-02-17 ENCOUNTER — APPOINTMENT (OUTPATIENT)
Dept: ANTEPARTUM | Facility: CLINIC | Age: 33
End: 2023-02-17
Payer: COMMERCIAL

## 2023-02-17 ENCOUNTER — ASOB RESULT (OUTPATIENT)
Age: 33
End: 2023-02-17

## 2023-02-17 PROCEDURE — 76811 OB US DETAILED SNGL FETUS: CPT

## 2023-02-23 ENCOUNTER — NON-APPOINTMENT (OUTPATIENT)
Age: 33
End: 2023-02-23

## 2023-02-24 ENCOUNTER — APPOINTMENT (OUTPATIENT)
Dept: OBGYN | Facility: CLINIC | Age: 33
End: 2023-02-24
Payer: COMMERCIAL

## 2023-02-24 PROCEDURE — 0502F SUBSEQUENT PRENATAL CARE: CPT

## 2023-04-06 ENCOUNTER — APPOINTMENT (OUTPATIENT)
Dept: OBGYN | Facility: CLINIC | Age: 33
End: 2023-04-06
Payer: COMMERCIAL

## 2023-04-06 ENCOUNTER — APPOINTMENT (OUTPATIENT)
Dept: OBGYN | Facility: CLINIC | Age: 33
End: 2023-04-06

## 2023-04-06 PROCEDURE — 0502F SUBSEQUENT PRENATAL CARE: CPT

## 2023-04-06 PROCEDURE — 36415 COLL VENOUS BLD VENIPUNCTURE: CPT

## 2023-04-07 LAB
25(OH)D3 SERPL-MCNC: 45 NG/ML
BASOPHILS # BLD AUTO: 0.02 K/UL
BASOPHILS NFR BLD AUTO: 0.2 %
BLD GP AB SCN SERPL QL: NORMAL
EOSINOPHIL # BLD AUTO: 0.03 K/UL
EOSINOPHIL NFR BLD AUTO: 0.4 %
GLUCOSE 1H P 50 G GLC PO SERPL-MCNC: 112 MG/DL
HCT VFR BLD CALC: 37.6 %
HCV AB SER QL: NONREACTIVE
HCV S/CO RATIO: 0.1 S/CO
HGB BLD-MCNC: 12.2 G/DL
HIV1+2 AB SPEC QL IA.RAPID: NONREACTIVE
IMM GRANULOCYTES NFR BLD AUTO: 0.4 %
LYMPHOCYTES # BLD AUTO: 1.64 K/UL
LYMPHOCYTES NFR BLD AUTO: 20.4 %
MAN DIFF?: NORMAL
MCHC RBC-ENTMCNC: 32.3 PG
MCHC RBC-ENTMCNC: 32.4 GM/DL
MCV RBC AUTO: 99.5 FL
MONOCYTES # BLD AUTO: 0.62 K/UL
MONOCYTES NFR BLD AUTO: 7.7 %
NEUTROPHILS # BLD AUTO: 5.71 K/UL
NEUTROPHILS NFR BLD AUTO: 70.9 %
PLATELET # BLD AUTO: 186 K/UL
RBC # BLD: 3.78 M/UL
RBC # FLD: 13.7 %
T PALLIDUM AB SER QL IA: NEGATIVE
WBC # FLD AUTO: 8.05 K/UL

## 2023-04-19 ENCOUNTER — NON-APPOINTMENT (OUTPATIENT)
Age: 33
End: 2023-04-19

## 2023-04-19 ENCOUNTER — OUTPATIENT (OUTPATIENT)
Dept: OUTPATIENT SERVICES | Facility: HOSPITAL | Age: 33
LOS: 1 days | End: 2023-04-19
Payer: COMMERCIAL

## 2023-04-19 VITALS
RESPIRATION RATE: 18 BRPM | SYSTOLIC BLOOD PRESSURE: 118 MMHG | HEART RATE: 69 BPM | TEMPERATURE: 98 F | OXYGEN SATURATION: 100 % | DIASTOLIC BLOOD PRESSURE: 63 MMHG

## 2023-04-19 VITALS
SYSTOLIC BLOOD PRESSURE: 104 MMHG | TEMPERATURE: 98 F | DIASTOLIC BLOOD PRESSURE: 64 MMHG | HEART RATE: 65 BPM | RESPIRATION RATE: 18 BRPM

## 2023-04-19 DIAGNOSIS — Z90.89 ACQUIRED ABSENCE OF OTHER ORGANS: Chronic | ICD-10-CM

## 2023-04-19 DIAGNOSIS — O26.899 OTHER SPECIFIED PREGNANCY RELATED CONDITIONS, UNSPECIFIED TRIMESTER: ICD-10-CM

## 2023-04-19 LAB
APPEARANCE UR: CLEAR — SIGNIFICANT CHANGE UP
BILIRUB UR-MCNC: NEGATIVE — SIGNIFICANT CHANGE UP
COLOR SPEC: COLORLESS — SIGNIFICANT CHANGE UP
DIFF PNL FLD: NEGATIVE — SIGNIFICANT CHANGE UP
GLUCOSE UR QL: NEGATIVE — SIGNIFICANT CHANGE UP
KETONES UR-MCNC: NEGATIVE — SIGNIFICANT CHANGE UP
LEUKOCYTE ESTERASE UR-ACNC: NEGATIVE — SIGNIFICANT CHANGE UP
NITRITE UR-MCNC: NEGATIVE — SIGNIFICANT CHANGE UP
PH UR: 7 — SIGNIFICANT CHANGE UP (ref 5–8)
PROT UR-MCNC: NEGATIVE — SIGNIFICANT CHANGE UP
SP GR SPEC: 1 — LOW (ref 1.01–1.02)
UROBILINOGEN FLD QL: NEGATIVE — SIGNIFICANT CHANGE UP

## 2023-04-19 PROCEDURE — G0463: CPT

## 2023-04-19 PROCEDURE — 81003 URINALYSIS AUTO W/O SCOPE: CPT

## 2023-04-19 PROCEDURE — 59025 FETAL NON-STRESS TEST: CPT

## 2023-04-19 RX ORDER — ACETAMINOPHEN 500 MG
975 TABLET ORAL ONCE
Refills: 0 | Status: DISCONTINUED | OUTPATIENT
Start: 2023-04-19 | End: 2023-05-04

## 2023-04-19 NOTE — OB PROVIDER TRIAGE NOTE - NSOBPROVIDERNOTE_OBGYN_ALL_OB_FT
Pt is a 32y  @28wk+2d  presenting with LLQ pain. No focal exam findings. No CVA tenderness. No cxt on toco. CL long. Ddx includes sciatia vs round ligament pain vs nephrolitiasis.     -send     [place] PGY3  d/w Dr. Norris

## 2023-04-19 NOTE — OB PROVIDER TRIAGE NOTE - HISTORY OF PRESENT ILLNESS
HPI: Pt is a 32y  @28wk+2d  presenting with LLQ pain.  Pain started in the back earlier today and has since progressed to the LLQ/pelvic pain.   Pain described as 3-4/10, intermittent, dull/sharp.  Denies fever/chill, dysuria.   +FM. -LOF. -VB.    PNC uncomplicated    OBHx: G1  GynHx: +PCOS, denies hx of fibroids, abnormal Pap smears or STDs  PMHx: denies  PSHx: denies  Med: PNV  All: Amox (rash)  Psych: denies hx of of depression or anxiety  SH: denies hx of smoking, drinking, or drug usage during the pregnancy    Vital Signs Last 24 Hrs  T(C): 36.8 (2023 17:53), Max: 36.8 (2023 17:45)  T(F): 98.24 (2023 17:53), Max: 98.24 (2023 17:53)  HR: 74 (2023 19:03) (63 - 79)  BP: 118/63 (2023 17:53) (118/63 - 118/63)  BP(mean): --  RR: 18 (2023 17:45) (18 - 18)  SpO2: 99% (2023 19:03) (99% - 100%)    Parameters below as of 2023 17:45  Patient On (Oxygen Delivery Method): room air        Physical Exam:  Gen: NAD, AOx3  CV: RRR  Resp: CTAB  Abd: soft, NT, gravid    SVE: C/L/H  FHT: 140, mod, + accels, no decels  [place]: no cxt  EFW: Sono/leopolds  Sono: vtx, anterior, CL 4.03

## 2023-04-19 NOTE — OB RN TRIAGE NOTE - FALL HARM RISK - UNIVERSAL INTERVENTIONS
Bed in lowest position, wheels locked, appropriate side rails in place/Call bell, personal items and telephone in reach/Instruct patient to call for assistance before getting out of bed or chair/Non-slip footwear when patient is out of bed/[place] to call system/Physically safe environment - no spills, clutter or unnecessary equipment/Purposeful Proactive Rounding/Room/bathroom lighting operational, light cord in reach

## 2023-04-20 DIAGNOSIS — R10.32 LEFT LOWER QUADRANT PAIN: ICD-10-CM

## 2023-04-20 DIAGNOSIS — O26.893 OTHER SPECIFIED PREGNANCY RELATED CONDITIONS, THIRD TRIMESTER: ICD-10-CM

## 2023-04-20 DIAGNOSIS — Z3A.28 28 WEEKS GESTATION OF PREGNANCY: ICD-10-CM

## 2023-04-20 DIAGNOSIS — O99.283 ENDOCRINE, NUTRITIONAL AND METABOLIC DISEASES COMPLICATING PREGNANCY, THIRD TRIMESTER: ICD-10-CM

## 2023-04-20 DIAGNOSIS — E28.2 POLYCYSTIC OVARIAN SYNDROME: ICD-10-CM

## 2023-04-26 ENCOUNTER — APPOINTMENT (OUTPATIENT)
Dept: OBGYN | Facility: CLINIC | Age: 33
End: 2023-04-26
Payer: COMMERCIAL

## 2023-04-26 DIAGNOSIS — Z23 ENCOUNTER FOR IMMUNIZATION: ICD-10-CM

## 2023-04-26 PROCEDURE — 90715 TDAP VACCINE 7 YRS/> IM: CPT

## 2023-04-26 PROCEDURE — 0502F SUBSEQUENT PRENATAL CARE: CPT

## 2023-04-26 PROCEDURE — 90471 IMMUNIZATION ADMIN: CPT

## 2023-05-05 ENCOUNTER — NON-APPOINTMENT (OUTPATIENT)
Age: 33
End: 2023-05-05

## 2023-05-08 ENCOUNTER — APPOINTMENT (OUTPATIENT)
Dept: OBGYN | Facility: CLINIC | Age: 33
End: 2023-05-08
Payer: COMMERCIAL

## 2023-05-08 ENCOUNTER — ASOB RESULT (OUTPATIENT)
Age: 33
End: 2023-05-08

## 2023-05-08 VITALS
SYSTOLIC BLOOD PRESSURE: 110 MMHG | DIASTOLIC BLOOD PRESSURE: 68 MMHG | BODY MASS INDEX: 26.06 KG/M2 | WEIGHT: 138 LBS | HEIGHT: 61 IN

## 2023-05-08 PROCEDURE — 0502F SUBSEQUENT PRENATAL CARE: CPT

## 2023-05-08 PROCEDURE — 76819 FETAL BIOPHYS PROFIL W/O NST: CPT | Mod: 59

## 2023-05-08 PROCEDURE — 76816 OB US FOLLOW-UP PER FETUS: CPT

## 2023-05-19 ENCOUNTER — NON-APPOINTMENT (OUTPATIENT)
Age: 33
End: 2023-05-19

## 2023-05-25 ENCOUNTER — APPOINTMENT (OUTPATIENT)
Dept: OBGYN | Facility: CLINIC | Age: 33
End: 2023-05-25
Payer: COMMERCIAL

## 2023-05-25 PROCEDURE — 0502F SUBSEQUENT PRENATAL CARE: CPT

## 2023-06-07 ENCOUNTER — ASOB RESULT (OUTPATIENT)
Age: 33
End: 2023-06-07

## 2023-06-07 ENCOUNTER — APPOINTMENT (OUTPATIENT)
Dept: OBGYN | Facility: CLINIC | Age: 33
End: 2023-06-07
Payer: COMMERCIAL

## 2023-06-07 ENCOUNTER — APPOINTMENT (OUTPATIENT)
Dept: OBGYN | Facility: CLINIC | Age: 33
End: 2023-06-07

## 2023-06-07 PROCEDURE — 76816 OB US FOLLOW-UP PER FETUS: CPT

## 2023-06-07 PROCEDURE — 0502F SUBSEQUENT PRENATAL CARE: CPT

## 2023-06-11 LAB — BACTERIA UR CULT: NORMAL

## 2023-06-12 ENCOUNTER — NON-APPOINTMENT (OUTPATIENT)
Age: 33
End: 2023-06-12

## 2023-06-14 ENCOUNTER — APPOINTMENT (OUTPATIENT)
Dept: OBGYN | Facility: CLINIC | Age: 33
End: 2023-06-14
Payer: COMMERCIAL

## 2023-06-14 ENCOUNTER — NON-APPOINTMENT (OUTPATIENT)
Age: 33
End: 2023-06-14

## 2023-06-14 PROCEDURE — 0502F SUBSEQUENT PRENATAL CARE: CPT

## 2023-06-14 NOTE — OB RN TRIAGE NOTE - NS_OBACUITY_OBGYN_ALL_OB_DT
19-Apr-2023 17:46 Isotretinoin Pregnancy And Lactation Text: This medication is Pregnancy Category X and is considered extremely dangerous during pregnancy. It is unknown if it is excreted in breast milk.

## 2023-06-15 ENCOUNTER — NON-APPOINTMENT (OUTPATIENT)
Age: 33
End: 2023-06-15

## 2023-06-16 NOTE — ED STATDOCS - WET READ LAUNCH FT
There are no Wet Read(s) to document. Consent 3/Introductory Paragraph: I gave the patient a chance to ask questions they had about the procedure.  Following this I explained the Mohs procedure and consent was obtained. The risks, benefits and alternatives to therapy were discussed in detail. Specifically, the risks of infection, scarring, bleeding, prolonged wound healing, incomplete removal, allergy to anesthesia, nerve injury and recurrence were addressed. Prior to the procedure, the treatment site was clearly identified and confirmed by the patient. All components of Universal Protocol/PAUSE Rule completed.

## 2023-06-18 LAB — B-HEM STREP SPEC QL CULT: NORMAL

## 2023-06-21 ENCOUNTER — NON-APPOINTMENT (OUTPATIENT)
Age: 33
End: 2023-06-21

## 2023-06-21 ENCOUNTER — APPOINTMENT (OUTPATIENT)
Dept: OBGYN | Facility: CLINIC | Age: 33
End: 2023-06-21
Payer: COMMERCIAL

## 2023-06-21 ENCOUNTER — OUTPATIENT (OUTPATIENT)
Dept: OUTPATIENT SERVICES | Facility: HOSPITAL | Age: 33
LOS: 1 days | End: 2023-06-21
Payer: COMMERCIAL

## 2023-06-21 VITALS
HEIGHT: 61 IN | BODY MASS INDEX: 26.62 KG/M2 | SYSTOLIC BLOOD PRESSURE: 110 MMHG | WEIGHT: 141 LBS | DIASTOLIC BLOOD PRESSURE: 68 MMHG

## 2023-06-21 VITALS
SYSTOLIC BLOOD PRESSURE: 112 MMHG | OXYGEN SATURATION: 98 % | HEIGHT: 62 IN | RESPIRATION RATE: 16 BRPM | TEMPERATURE: 98 F | WEIGHT: 141.98 LBS | HEART RATE: 68 BPM | DIASTOLIC BLOOD PRESSURE: 76 MMHG

## 2023-06-21 DIAGNOSIS — Z34.90 ENCOUNTER FOR SUPERVISION OF NORMAL PREGNANCY, UNSPECIFIED, UNSPECIFIED TRIMESTER: ICD-10-CM

## 2023-06-21 DIAGNOSIS — Z01.818 ENCOUNTER FOR OTHER PREPROCEDURAL EXAMINATION: ICD-10-CM

## 2023-06-21 DIAGNOSIS — O32.2XX0 MATERNAL CARE FOR TRANSVERSE AND OBLIQUE LIE, NOT APPLICABLE OR UNSPECIFIED: ICD-10-CM

## 2023-06-21 DIAGNOSIS — Z90.89 ACQUIRED ABSENCE OF OTHER ORGANS: Chronic | ICD-10-CM

## 2023-06-21 LAB
BLD GP AB SCN SERPL QL: NEGATIVE — SIGNIFICANT CHANGE UP
HCT VFR BLD CALC: 35.9 % — SIGNIFICANT CHANGE UP (ref 34.5–45)
HGB BLD-MCNC: 12.7 G/DL — SIGNIFICANT CHANGE UP (ref 11.5–15.5)
MCHC RBC-ENTMCNC: 31.5 PG — SIGNIFICANT CHANGE UP (ref 27–34)
MCHC RBC-ENTMCNC: 35.4 GM/DL — SIGNIFICANT CHANGE UP (ref 32–36)
MCV RBC AUTO: 89.1 FL — SIGNIFICANT CHANGE UP (ref 80–100)
NRBC # BLD: 0 /100 WBCS — SIGNIFICANT CHANGE UP (ref 0–0)
PLATELET # BLD AUTO: 147 K/UL — LOW (ref 150–400)
RBC # BLD: 4.03 M/UL — SIGNIFICANT CHANGE UP (ref 3.8–5.2)
RBC # FLD: 12.3 % — SIGNIFICANT CHANGE UP (ref 10.3–14.5)
RH IG SCN BLD-IMP: POSITIVE — SIGNIFICANT CHANGE UP
WBC # BLD: 10.29 K/UL — SIGNIFICANT CHANGE UP (ref 3.8–10.5)
WBC # FLD AUTO: 10.29 K/UL — SIGNIFICANT CHANGE UP (ref 3.8–10.5)

## 2023-06-21 PROCEDURE — 0502F SUBSEQUENT PRENATAL CARE: CPT

## 2023-06-21 PROCEDURE — 86901 BLOOD TYPING SEROLOGIC RH(D): CPT

## 2023-06-21 PROCEDURE — 86850 RBC ANTIBODY SCREEN: CPT

## 2023-06-21 PROCEDURE — G0463: CPT

## 2023-06-21 PROCEDURE — 86900 BLOOD TYPING SEROLOGIC ABO: CPT

## 2023-06-21 PROCEDURE — 85027 COMPLETE CBC AUTOMATED: CPT

## 2023-06-21 NOTE — OB PST NOTE - NSICDXPASTMEDICALHX_GEN_ALL_CORE_FT
PAST MEDICAL HISTORY:  IBS (irritable bowel syndrome)     Transverse fetal lie      PAST MEDICAL HISTORY:  History of COVID-19     IBS (irritable bowel syndrome)     Transverse fetal lie

## 2023-06-21 NOTE — OB PST NOTE - HISTORY OF PRESENT ILLNESS
32 yr old F  , 37 weeks of gestation , PIERCE 7/10/2023 , presents to [facility] for scheduled primary  , transverse LIE on 2023. Denies fever, chills, no acute complaints.  32 yr old F  , 37 weeks of gestation , PIERCE 7/10/2023 , presents to [facility] for scheduled primary  2023 due to transverse fetal lie. Denies fever, chills, no acute complaints.

## 2023-06-21 NOTE — OB PST NOTE - PROBLEM SELECTOR PLAN 1
primary    PST instructions provided, surgical scrub given, patient verbalized understanding  CBC, T&S collected and sent

## 2023-06-27 ENCOUNTER — APPOINTMENT (OUTPATIENT)
Dept: OBGYN | Facility: HOSPITAL | Age: 33
End: 2023-06-27

## 2023-06-28 ENCOUNTER — NON-APPOINTMENT (OUTPATIENT)
Age: 33
End: 2023-06-28

## 2023-06-28 ENCOUNTER — APPOINTMENT (OUTPATIENT)
Dept: OBGYN | Facility: CLINIC | Age: 33
End: 2023-06-28
Payer: COMMERCIAL

## 2023-06-28 VITALS
DIASTOLIC BLOOD PRESSURE: 79 MMHG | BODY MASS INDEX: 26.81 KG/M2 | SYSTOLIC BLOOD PRESSURE: 119 MMHG | HEIGHT: 61 IN | WEIGHT: 142 LBS

## 2023-06-28 DIAGNOSIS — Z34.03 ENCOUNTER FOR SUPERVISION OF NORMAL FIRST PREGNANCY, THIRD TRIMESTER: ICD-10-CM

## 2023-06-28 DIAGNOSIS — Z34.02 ENCOUNTER FOR SUPERVISION OF NORMAL FIRST PREGNANCY, SECOND TRIMESTER: ICD-10-CM

## 2023-06-28 PROBLEM — Z86.16 PERSONAL HISTORY OF COVID-19: Chronic | Status: ACTIVE | Noted: 2023-06-21

## 2023-06-28 PROBLEM — O32.2XX0: Chronic | Status: ACTIVE | Noted: 2023-06-21

## 2023-06-28 PROCEDURE — 0502F SUBSEQUENT PRENATAL CARE: CPT

## 2023-06-30 ENCOUNTER — OUTPATIENT (OUTPATIENT)
Dept: OUTPATIENT SERVICES | Facility: HOSPITAL | Age: 33
LOS: 1 days | End: 2023-06-30
Payer: COMMERCIAL

## 2023-06-30 ENCOUNTER — NON-APPOINTMENT (OUTPATIENT)
Age: 33
End: 2023-06-30

## 2023-06-30 VITALS — HEART RATE: 67 BPM | SYSTOLIC BLOOD PRESSURE: 103 MMHG | DIASTOLIC BLOOD PRESSURE: 51 MMHG

## 2023-06-30 VITALS — HEART RATE: 77 BPM | OXYGEN SATURATION: 97 %

## 2023-06-30 DIAGNOSIS — Z90.89 ACQUIRED ABSENCE OF OTHER ORGANS: Chronic | ICD-10-CM

## 2023-06-30 DIAGNOSIS — O26.899 OTHER SPECIFIED PREGNANCY RELATED CONDITIONS, UNSPECIFIED TRIMESTER: ICD-10-CM

## 2023-06-30 PROCEDURE — 83986 ASSAY PH BODY FLUID NOS: CPT

## 2023-06-30 PROCEDURE — 59025 FETAL NON-STRESS TEST: CPT

## 2023-06-30 PROCEDURE — G0463: CPT

## 2023-06-30 NOTE — OB PROVIDER TRIAGE NOTE - NSHPPHYSICALEXAM_GEN_ALL_CORE
Objective  VS  T(C): 36.6 (06-30-23 @ 18:13)  HR: 72 (06-30-23 @ 19:04)  BP: 104/53 (06-30-23 @ 18:13)  RR: 18 (06-30-23 @ 18:13)  SpO2: 97% (06-30-23 @ 19:04)    PE:   CV: clinically well perfused  Pulm: breathing comfortably on RA  Abd: gravid, nontender  Extr: moving all extremities with ease     Spec: no pooling, neg nitrazine, no ferning, no bleeding  VE: closed     FHT: baseline 1**, mod variability, +accels, -decels  [place]: acontractile   Sono: vertex, AZAEL 7 Objective  VS  T(C): 36.6 (06-30-23 @ 18:13)  HR: 72 (06-30-23 @ 19:04)  BP: 104/53 (06-30-23 @ 18:13)  RR: 18 (06-30-23 @ 18:13)  SpO2: 97% (06-30-23 @ 19:04)    PE:   CV: clinically well perfused  Pulm: breathing comfortably on RA  Abd: gravid, nontender  Extr: moving all extremities with ease     Spec: no pooling, neg nitrazine, no ferning, no bleeding  VE: 0/0/-3    FHT: baseline 130, mod variability, +accels, -decels  [place]: irregular contractions <1 in 10 min  Sono: vertex, AZAEL 7, L anterior placenta

## 2023-06-30 NOTE — OB PROVIDER TRIAGE NOTE - NSOBPROVIDERNOTE_OBGYN_ALL_OB_FT
Assessment  33yo  at 38w4d presents for r/o rupture. Pt ruled out for SROM and labor.     Plan  - Fetus: reactive tracing. VTX. Continuous EFM. Sono. No concerns.  - Prenatal issues: none  - GBS neg, EFW 3200 g by extrapolation from 35w  - Discharge and follow up in the office on Monday for repeat AZAEL     D/w attending Dr. Arya Arias, MS3 Assessment  33yo  at 38w4d presents for r/o rupture and r/o labor, found to be intact with non-dilated cervix. Pt ruled out for SROM and labor.     Plan  - NST  - Patient cleared for discharge to home pending NST  - Follow up in the office on Monday, 7/3, for repeat AZAEL     D/w attending Dr. Arya Arias, MS3    Pt seen and assessed. Note edited where appropriate.   Judith Lindo PGY2

## 2023-06-30 NOTE — OB PROVIDER TRIAGE NOTE - HISTORY OF PRESENT ILLNESS
Triage H&P    Subjective  HPI: 33yo  at 38w4d, PIERCE 7/10/2023 presents for r/o rupture. Pt reports irregular contractions for the past few days and nausea starting yesterday. Pt noticed leakage of a small amount of clear watery fluid this morning around 10:30 am. Pt has had no continued trickling or other gushes of fluid since then. +FM. -VB. Pt denies any other concerns.    PNC: Denies prenatal issues. GBS neg.  EFW 3200 g by extrapolation from 35w  OBHx:   GynHx: denies  PMH: denies  PSH: tonsillectomy   Meds: PNV   Allergies: amoxicillin (rash)  Social: denies substance use  Will accept blood transfusions? Yes             Triage H&P    Subjective  HPI: 33yo  at 38w4d, PIERCE 7/10/2023 presents for r/o rupture of membranes and r/o labor. Pt reports irregular contractions for the past few days. Pt noticed leakage of a small amount of clear watery fluid this morning around 10:30 am. Pt has had no continued trickling or other gushes of fluid since then. +FM. -VB. Pt denies any other concerns.    PNC: Denies prenatal issues. GBS neg.  EFW 3200 g by extrapolation from 35w sono.  OBHx:   GynHx: denies  PMH: denies  PSH: tonsillectomy (14 yr ago)  Meds: PNV   Allergies: amoxicillin (rash)

## 2023-07-03 ENCOUNTER — APPOINTMENT (OUTPATIENT)
Dept: OBGYN | Facility: CLINIC | Age: 33
End: 2023-07-03
Payer: COMMERCIAL

## 2023-07-03 ENCOUNTER — ASOB RESULT (OUTPATIENT)
Age: 33
End: 2023-07-03

## 2023-07-03 DIAGNOSIS — Z3A.38 38 WEEKS GESTATION OF PREGNANCY: ICD-10-CM

## 2023-07-03 DIAGNOSIS — O47.1 FALSE LABOR AT OR AFTER 37 COMPLETED WEEKS OF GESTATION: ICD-10-CM

## 2023-07-03 DIAGNOSIS — Z03.71 ENCOUNTER FOR SUSPECTED PROBLEM WITH AMNIOTIC CAVITY AND MEMBRANE RULED OUT: ICD-10-CM

## 2023-07-03 PROCEDURE — 76819 FETAL BIOPHYS PROFIL W/O NST: CPT | Mod: 59

## 2023-07-03 PROCEDURE — 76816 OB US FOLLOW-UP PER FETUS: CPT

## 2023-07-04 ENCOUNTER — INPATIENT (INPATIENT)
Facility: HOSPITAL | Age: 33
LOS: 1 days | Discharge: HOME | End: 2023-07-06
Attending: STUDENT IN AN ORGANIZED HEALTH CARE EDUCATION/TRAINING PROGRAM | Admitting: STUDENT IN AN ORGANIZED HEALTH CARE EDUCATION/TRAINING PROGRAM
Payer: COMMERCIAL

## 2023-07-04 ENCOUNTER — OUTPATIENT (OUTPATIENT)
Dept: INPATIENT UNIT | Facility: HOSPITAL | Age: 33
LOS: 1 days | End: 2023-07-04
Payer: COMMERCIAL

## 2023-07-04 VITALS — HEART RATE: 66 BPM | OXYGEN SATURATION: 97 %

## 2023-07-04 VITALS — OXYGEN SATURATION: 100 % | HEART RATE: 77 BPM

## 2023-07-04 VITALS — TEMPERATURE: 98 F

## 2023-07-04 DIAGNOSIS — Z90.89 ACQUIRED ABSENCE OF OTHER ORGANS: Chronic | ICD-10-CM

## 2023-07-04 DIAGNOSIS — Z34.80 ENCOUNTER FOR SUPERVISION OF OTHER NORMAL PREGNANCY, UNSPECIFIED TRIMESTER: ICD-10-CM

## 2023-07-04 DIAGNOSIS — O26.899 OTHER SPECIFIED PREGNANCY RELATED CONDITIONS, UNSPECIFIED TRIMESTER: ICD-10-CM

## 2023-07-04 LAB
BASOPHILS # BLD AUTO: 0.03 K/UL — SIGNIFICANT CHANGE UP (ref 0–0.2)
BASOPHILS NFR BLD AUTO: 0.3 % — SIGNIFICANT CHANGE UP (ref 0–2)
BLD GP AB SCN SERPL QL: NEGATIVE — SIGNIFICANT CHANGE UP
COVID-19 SPIKE DOMAIN AB INTERP: POSITIVE
COVID-19 SPIKE DOMAIN ANTIBODY RESULT: 227 U/ML — HIGH
EOSINOPHIL # BLD AUTO: 0.02 K/UL — SIGNIFICANT CHANGE UP (ref 0–0.5)
EOSINOPHIL NFR BLD AUTO: 0.2 % — SIGNIFICANT CHANGE UP (ref 0–6)
HCT VFR BLD CALC: 39.1 % — SIGNIFICANT CHANGE UP (ref 34.5–45)
HGB BLD-MCNC: 13.7 G/DL — SIGNIFICANT CHANGE UP (ref 11.5–15.5)
IMM GRANULOCYTES NFR BLD AUTO: 0.4 % — SIGNIFICANT CHANGE UP (ref 0–0.9)
LYMPHOCYTES # BLD AUTO: 17.6 % — SIGNIFICANT CHANGE UP (ref 13–44)
LYMPHOCYTES # BLD AUTO: 2.06 K/UL — SIGNIFICANT CHANGE UP (ref 1–3.3)
MCHC RBC-ENTMCNC: 31.6 PG — SIGNIFICANT CHANGE UP (ref 27–34)
MCHC RBC-ENTMCNC: 35 GM/DL — SIGNIFICANT CHANGE UP (ref 32–36)
MCV RBC AUTO: 90.1 FL — SIGNIFICANT CHANGE UP (ref 80–100)
MONOCYTES # BLD AUTO: 0.9 K/UL — SIGNIFICANT CHANGE UP (ref 0–0.9)
MONOCYTES NFR BLD AUTO: 7.7 % — SIGNIFICANT CHANGE UP (ref 2–14)
NEUTROPHILS # BLD AUTO: 8.66 K/UL — HIGH (ref 1.8–7.4)
NEUTROPHILS NFR BLD AUTO: 73.8 % — SIGNIFICANT CHANGE UP (ref 43–77)
NRBC # BLD: 0 /100 WBCS — SIGNIFICANT CHANGE UP (ref 0–0)
PLATELET # BLD AUTO: 139 K/UL — LOW (ref 150–400)
RBC # BLD: 4.34 M/UL — SIGNIFICANT CHANGE UP (ref 3.8–5.2)
RBC # FLD: 12.6 % — SIGNIFICANT CHANGE UP (ref 10.3–14.5)
RH IG SCN BLD-IMP: POSITIVE — SIGNIFICANT CHANGE UP
SARS-COV-2 IGG+IGM SERPL QL IA: 227 U/ML — HIGH
SARS-COV-2 IGG+IGM SERPL QL IA: POSITIVE
T PALLIDUM AB TITR SER: NEGATIVE — SIGNIFICANT CHANGE UP
WBC # BLD: 11.72 K/UL — HIGH (ref 3.8–10.5)
WBC # FLD AUTO: 11.72 K/UL — HIGH (ref 3.8–10.5)

## 2023-07-04 PROCEDURE — 59400 OBSTETRICAL CARE: CPT

## 2023-07-04 PROCEDURE — 59025 FETAL NON-STRESS TEST: CPT | Mod: 26

## 2023-07-04 PROCEDURE — G0463: CPT

## 2023-07-04 RX ORDER — TETANUS TOXOID, REDUCED DIPHTHERIA TOXOID AND ACELLULAR PERTUSSIS VACCINE, ADSORBED 5; 2.5; 8; 8; 2.5 [IU]/.5ML; [IU]/.5ML; UG/.5ML; UG/.5ML; UG/.5ML
0.5 SUSPENSION INTRAMUSCULAR ONCE
Refills: 0 | Status: DISCONTINUED | OUTPATIENT
Start: 2023-07-04 | End: 2023-07-06

## 2023-07-04 RX ORDER — OXYCODONE HYDROCHLORIDE 5 MG/1
5 TABLET ORAL ONCE
Refills: 0 | Status: DISCONTINUED | OUTPATIENT
Start: 2023-07-04 | End: 2023-07-06

## 2023-07-04 RX ORDER — SODIUM CHLORIDE 9 MG/ML
500 INJECTION, SOLUTION INTRAVENOUS ONCE
Refills: 0 | Status: DISCONTINUED | OUTPATIENT
Start: 2023-07-04 | End: 2023-07-06

## 2023-07-04 RX ORDER — CHLORHEXIDINE GLUCONATE 213 G/1000ML
1 SOLUTION TOPICAL DAILY
Refills: 0 | Status: DISCONTINUED | OUTPATIENT
Start: 2023-07-04 | End: 2023-07-04

## 2023-07-04 RX ORDER — ASPIRIN/CALCIUM CARB/MAGNESIUM 324 MG
1 TABLET ORAL
Refills: 0 | DISCHARGE

## 2023-07-04 RX ORDER — AER TRAVELER 0.5 G/1
1 SOLUTION RECTAL; TOPICAL EVERY 4 HOURS
Refills: 0 | Status: DISCONTINUED | OUTPATIENT
Start: 2023-07-04 | End: 2023-07-06

## 2023-07-04 RX ORDER — SODIUM CHLORIDE 9 MG/ML
3 INJECTION INTRAMUSCULAR; INTRAVENOUS; SUBCUTANEOUS EVERY 8 HOURS
Refills: 0 | Status: DISCONTINUED | OUTPATIENT
Start: 2023-07-04 | End: 2023-07-06

## 2023-07-04 RX ORDER — ACETAMINOPHEN 500 MG
975 TABLET ORAL
Refills: 0 | Status: DISCONTINUED | OUTPATIENT
Start: 2023-07-04 | End: 2023-07-06

## 2023-07-04 RX ORDER — HYDROCORTISONE 1 %
1 OINTMENT (GRAM) TOPICAL EVERY 6 HOURS
Refills: 0 | Status: DISCONTINUED | OUTPATIENT
Start: 2023-07-04 | End: 2023-07-06

## 2023-07-04 RX ORDER — MAGNESIUM HYDROXIDE 400 MG/1
30 TABLET, CHEWABLE ORAL
Refills: 0 | Status: DISCONTINUED | OUTPATIENT
Start: 2023-07-04 | End: 2023-07-06

## 2023-07-04 RX ORDER — SODIUM CHLORIDE 9 MG/ML
1000 INJECTION, SOLUTION INTRAVENOUS
Refills: 0 | Status: DISCONTINUED | OUTPATIENT
Start: 2023-07-04 | End: 2023-07-04

## 2023-07-04 RX ORDER — SODIUM CHLORIDE 9 MG/ML
1000 INJECTION, SOLUTION INTRAVENOUS
Refills: 0 | Status: DISCONTINUED | OUTPATIENT
Start: 2023-07-04 | End: 2023-07-06

## 2023-07-04 RX ORDER — IBUPROFEN 200 MG
600 TABLET ORAL EVERY 6 HOURS
Refills: 0 | Status: COMPLETED | OUTPATIENT
Start: 2023-07-04 | End: 2024-06-01

## 2023-07-04 RX ORDER — OXYCODONE HYDROCHLORIDE 5 MG/1
5 TABLET ORAL
Refills: 0 | Status: DISCONTINUED | OUTPATIENT
Start: 2023-07-04 | End: 2023-07-06

## 2023-07-04 RX ORDER — SODIUM CHLORIDE 9 MG/ML
1000 INJECTION, SOLUTION INTRAVENOUS
Refills: 0 | Status: DISCONTINUED | OUTPATIENT
Start: 2023-07-04 | End: 2023-07-05

## 2023-07-04 RX ORDER — DIBUCAINE 1 %
1 OINTMENT (GRAM) RECTAL EVERY 6 HOURS
Refills: 0 | Status: DISCONTINUED | OUTPATIENT
Start: 2023-07-04 | End: 2023-07-06

## 2023-07-04 RX ORDER — OXYTOCIN 10 UNIT/ML
333.33 VIAL (ML) INJECTION
Qty: 20 | Refills: 0 | Status: DISCONTINUED | OUTPATIENT
Start: 2023-07-04 | End: 2023-07-04

## 2023-07-04 RX ORDER — PRAMOXINE HYDROCHLORIDE 150 MG/15G
1 AEROSOL, FOAM RECTAL EVERY 4 HOURS
Refills: 0 | Status: DISCONTINUED | OUTPATIENT
Start: 2023-07-04 | End: 2023-07-06

## 2023-07-04 RX ORDER — OXYTOCIN 10 UNIT/ML
333.33 VIAL (ML) INJECTION
Qty: 20 | Refills: 0 | Status: DISCONTINUED | OUTPATIENT
Start: 2023-07-04 | End: 2023-07-06

## 2023-07-04 RX ORDER — CITRIC ACID/SODIUM CITRATE 300-500 MG
15 SOLUTION, ORAL ORAL EVERY 6 HOURS
Refills: 0 | Status: DISCONTINUED | OUTPATIENT
Start: 2023-07-04 | End: 2023-07-04

## 2023-07-04 RX ORDER — OXYTOCIN 10 UNIT/ML
41.67 VIAL (ML) INJECTION
Qty: 20 | Refills: 0 | Status: DISCONTINUED | OUTPATIENT
Start: 2023-07-04 | End: 2023-07-06

## 2023-07-04 RX ORDER — KETOROLAC TROMETHAMINE 30 MG/ML
30 SYRINGE (ML) INJECTION ONCE
Refills: 0 | Status: DISCONTINUED | OUTPATIENT
Start: 2023-07-04 | End: 2023-07-05

## 2023-07-04 RX ORDER — CITRIC ACID/SODIUM CITRATE 300-500 MG
15 SOLUTION, ORAL ORAL EVERY 6 HOURS
Refills: 0 | Status: DISCONTINUED | OUTPATIENT
Start: 2023-07-04 | End: 2023-07-05

## 2023-07-04 RX ORDER — CHLORHEXIDINE GLUCONATE 213 G/1000ML
1 SOLUTION TOPICAL DAILY
Refills: 0 | Status: DISCONTINUED | OUTPATIENT
Start: 2023-07-04 | End: 2023-07-05

## 2023-07-04 RX ORDER — BENZOCAINE 10 %
1 GEL (GRAM) MUCOUS MEMBRANE EVERY 6 HOURS
Refills: 0 | Status: DISCONTINUED | OUTPATIENT
Start: 2023-07-04 | End: 2023-07-06

## 2023-07-04 RX ORDER — DIPHENHYDRAMINE HCL 50 MG
25 CAPSULE ORAL EVERY 6 HOURS
Refills: 0 | Status: DISCONTINUED | OUTPATIENT
Start: 2023-07-04 | End: 2023-07-06

## 2023-07-04 RX ORDER — LANOLIN
1 OINTMENT (GRAM) TOPICAL EVERY 6 HOURS
Refills: 0 | Status: DISCONTINUED | OUTPATIENT
Start: 2023-07-04 | End: 2023-07-06

## 2023-07-04 RX ORDER — SIMETHICONE 80 MG/1
80 TABLET, CHEWABLE ORAL EVERY 4 HOURS
Refills: 0 | Status: DISCONTINUED | OUTPATIENT
Start: 2023-07-04 | End: 2023-07-06

## 2023-07-04 NOTE — OB PROVIDER H&P - NSLOWPPHRISK_OBGYN_A_OB
No previous uterine incision/[place]ana Pregnancy/Less than or equal to 4 previous vaginal births/No known bleeding disorder/No history of postpartum hemorrhage/No other PPH risks indicated

## 2023-07-04 NOTE — OB RN DELIVERY SUMMARY - NS_SEPSISRSKCALC_OBGYN_ALL_OB_FT
EOS calculated successfully. EOS Risk Factor: 0.5/1000 live births ([facility] national incidence); GA=39w1d; Temp=99.14; ROM=1.083; GBS='Negative'; Antibiotics='No antibiotics or any antibiotics < 2 hrs prior to birth'

## 2023-07-04 NOTE — OB PROVIDER DELIVERY SUMMARY - NSPROVIDERDELIVERYNOTE_OBGYN_ALL_OB_FT
of live female infant, APGARS 9, 9, 3VC. Placenta delivered spontaneously intact. Cervix examined and intact. Repair of first degree and periurethral lacerations in the usual fashion. Rectal following repair wnl. EBL 250cc. Mom to recover in room in stable condition. Baby to nursery in stable condition.  MD Amarilis

## 2023-07-04 NOTE — OB RN DELIVERY SUMMARY - NS_BABYDISPOA_OBGYN_ALL_OB
"You were seen today in the Cardiovascular Clinic at the [facility].       Cardiology Providers you saw during your visit: Dr. Loco      Medication Changes:   1. STOP lasix.  2. STOP sotalol.  3. STOP potassium.  4. We will increase your coreg. I will check with Shannan to see what exact dosage we will increase to.      Follow up Appointment Information:  1. Return to see Dr. Loco in 3 months with labs prior.       Results:    Results for FLOR STEVENSON (MRN [id number]) as of 2020 15:35   Ref. Range 2020 13:16   Sodium Latest Ref Range: 133 - 144 mmol/L 140   Potassium Latest Ref Range: 3.4 - 5.3 mmol/L 4.6   Chloride Latest Ref Range: 94 - 109 mmol/L 107   Carbon Dioxide Latest Ref Range: 20 - 32 mmol/L 26   Urea Nitrogen Latest Ref Range: 7 - 30 mg/dL 22   Creatinine Latest Ref Range: 0.66 - 1.25 mg/dL 2.69 (H)   GFR Estimate Latest Ref Range: >60 mL/min/1.73_m2 22 (L)   GFR Estimate If Black Latest Ref Range: >60 mL/min/1.73_m2 25 (L)   Calcium Latest Ref Range: 8.5 - 10.1 mg/dL 9.3   Anion Gap Latest Ref Range: 3 - 14 mmol/L 7   Albumin Latest Ref Range: 3.4 - 5.0 g/dL 3.4   Protein Total Latest Ref Range: 6.8 - 8.8 g/dL 8.4   Bilirubin Total Latest Ref Range: 0.2 - 1.3 mg/dL 1.2   Alkaline Phosphatase Latest Ref Range: 40 - 150 U/L 229 (H)   ALT Latest Ref Range: 0 - 70 U/L 27   AST Latest Ref Range: 0 - 45 U/L 60 (H)   N-Terminal Pro Bnp Latest Ref Range: 0 - 450 pg/mL 1,410 (H)   Glucose Latest Ref Range: 70 - 99 mg/dL 235 (H)         [address] [facility] [place] [address] [place] [address]        Thank you for allowing us to be a part of your care here at the [facility]      If you have questions or concerns please contact us at:      Calli Aguayo RN BSN   Cardiology Care Coordinator  [facility]   Questions and [phone]   First press #1 for the University and then press #4 to \"send a message to your care team\" " Mother's Bedside/Non-

## 2023-07-04 NOTE — CHART NOTE - NSCHARTNOTEFT_GEN_A_CORE
/Attending;    Called to the room of this 31y/o  @39.1wks who was admitted in labor and is now fully dilated and .    Pt's Pvt. MD (Dr. Olmos) called and was in route.  Pt continued to labor down and then head was delivered and pt's MD then present.  Please see delivery note.    Dr. Williamson

## 2023-07-04 NOTE — OB RN TRIAGE NOTE - NS_FETALMOVEMENT_OBGYN_ALL_OB
Nursing Note by Bharati Santiago CMA at 18 01:10 PM     Author:  Bharati Santiago CMA Service:  (none) Author Type:  Certified Medical Assistant     Filed:  18 01:10 PM Encounter Date:  2018 Status:  Signed     :  Bharati Santiago CMA (Certified Medical Assistant)            1:10 PM  Chief Complaint     Patient presents with     • Trauma      pt fell 30 minutes ago outside into the [facility], right arm injury with bump, swelling, bruising, and left forehead injury, bump, bruising     Patient brought to exam room.  Electronically Signed by:    Bharati Santiago CMA , 2018  Patient's name,  and allergies verified with[JW1.1T] patient and patient's son[JW1.1M].  Last visit with AYLA MEDRANO was on 2015 at 11:45 AM in [facility] [place]  Last visit with [facility] was on 03/15/2018 at  1:10 PM in [facility]  Match done based on reference date of today 18  Sarah Roman was offered treatment for pain control:[JW1.1T] Yes.  Patient refused comfort measures to reduce pain.[JW1.1M]            Revision History        User Key Date/Time User Provider Type Action    > JW1.1 18 01:10 PM Bharati Santiago CMA Certified Medical Assistant Sign    M - Manual, T - Template             Present, unchanged

## 2023-07-04 NOTE — OB PROVIDER TRIAGE NOTE - HISTORY OF PRESENT ILLNESS
33yo  @39w1d presents for rule out labor. Patient has been having contractions since 8pm 7/3 that have been increasing in frequency and intensity with pain now 8/10. The patient was evaluated at 2a and discharged in early labor. Patient was seen on Friday, exam was 0cm at that time.  +FM. -VB. -LOF. +Ctx.  EFW:   GBS: negative    OBHx: no complications during this pregnancy  GynHx: denies history of fibroids, cysts, STD  PMHx: denies  SHx: tonsillectomy  Meds: none  All: penicillin  Psych: denies depression or anxiety

## 2023-07-04 NOTE — OB PROVIDER TRIAGE NOTE - NSICDXPASTMEDICALHX_GEN_ALL_CORE_FT
PAST MEDICAL HISTORY:  History of COVID-19     IBS (irritable bowel syndrome)     Transverse fetal lie

## 2023-07-04 NOTE — OB RN DELIVERY SUMMARY - NS_ROMTYPE_OBGYN_ALL_OB
SROM Is This A New Presentation, Or A Follow-Up?: Skin Lesion How Severe Is Your Skin Lesion?: severe Has Your Skin Lesion Been Treated?: not been treated Additional History: Patient states lesion has been coming and going for about one year. He was advised by his PCP to have it evaluated for reassurance.

## 2023-07-04 NOTE — OB PROVIDER H&P - HISTORY OF PRESENT ILLNESS
33yo  @39w1d presents for rule out labor. Patient has been having contractions since 8pm 7/3 that have been increasing in frequency and intensity with pain now 8/10. The patient was evaluated at 2a and discharged in early labor. Patient was seen on Friday, exam was 0cm at that time. The patient's  reports having HSV with daily suppression therapy.   +FM. -VB. -LOF. +Ctx.  EFW:   GBS: negative    OBHx: no complications during this pregnancy  GynHx: denies history of fibroids, cysts, STD  PMHx: denies  SHx: tonsillectomy  Meds: none  All: penicillin  Psych: denies depression or anxiety

## 2023-07-04 NOTE — OB PROVIDER DELIVERY SUMMARY - NSSELHIDDEN_OBGYN_ALL_OB_FT
[NS_DeliveryAttending1_OBGYN_ALL_OB_FT:[id number]],[NS_DeliveryAttending2_OBGYN_ALL_OB_FT:[id number]]

## 2023-07-04 NOTE — OB RN TRIAGE NOTE - FALL HARM RISK - UNIVERSAL INTERVENTIONS
Bed in lowest position, wheels locked, appropriate side rails in place/Call bell, personal items and telephone in reach/Instruct patient to call for assistance before getting out of bed or chair/Non-slip footwear when patient is out of bed/[place] to call system/Physically safe environment - no spills, clutter or unnecessary equipment/Purposeful Proactive Rounding/Room/bathroom lighting operational, light cord in reach

## 2023-07-04 NOTE — OB PROVIDER H&P - ASSESSMENT
31 y/o  @ 39.1 weeks presenting to L&D in early labor  -admit to L&D  -routine labs  -NPO bicitra  -EFM/toco  -IV hydration  -SSE  -anesthesia consult  -anticipated     d/w Dr. Amarilis Mcfadden NP

## 2023-07-04 NOTE — OB PROVIDER H&P - NSHPREVIEWOFSYSTEMS_GEN_ALL_CORE
ICU Vital Signs Last 24 Hrs  T(C): 36.8 (04 Jul 2023 11:14), Max: 36.8 (04 Jul 2023 02:02)  T(F): 98.24 (04 Jul 2023 11:14), Max: 98.24 (04 Jul 2023 09:05)  HR: 76 (04 Jul 2023 11:38) (63 - 82)  BP: 120/73 (04 Jul 2023 11:14) (115/76 - 132/80)  BP(mean): --  ABP: --  ABP(mean): --  RR: 16 (04 Jul 2023 11:14) (16 - 18)  SpO2: 99% (04 Jul 2023 11:38) (92% - 100%)    O2 Parameters below as of 04 Jul 2023 08:37  Patient On (Oxygen Delivery Method): room air    gen: A&Ox3  CV: rrr s1s2  abd: gravid soft  VE: 2/80/-3 intact  EFM: 135 moderate variability, + acels, -decels  toco: Q2mins  bedside sonogram: cephalic presentation  SSE: negative for lesions

## 2023-07-04 NOTE — OB PROVIDER LABOR PROGRESS NOTE - NS_SUBJECTIVE/OBJECTIVE_OBGYN_ALL_OB_FT
NP Chart Note:    The patient was evaluated for continued labor progress. The patient was noted to be vomiting while off the monitor, however overall tolerating contraction pain. The patient was examined and found to be 1.5/80/-3. Will continue intermittent monitoring

## 2023-07-04 NOTE — OB PROVIDER DELIVERY SUMMARY - NS_BIRTHTRAUMAA_OBGYN_ALL_OB
Comprehensive Intake Entered On:  6/16/2020 11:34 AM CDT    Performed On:  6/16/2020 11:33 AM CDT by Schoenike , Andrea               Summary   Chief Complaint :   Lab draw and vitals   Weight Measured :   224 lb(Converted to: 224 lb 0 oz, 101.60 kg)    Systolic Blood Pressure :   122 mmHg   Diastolic Blood Pressure :   75 mmHg   Mean Arterial Pressure :   91 mmHg   Peripheral Pulse Rate :   69 bpm   BP Site :   Left arm   Pulse Site :   Radial artery   BP Method :   Electronic   HR Method :   Electronic   Oxygen Saturation :   95 %   Schoenike , Andrea - 6/16/2020 11:33 AM CDT   ID Risk Screen   Recent Travel History :   No recent travel   Family Member Travel History :   No recent travel   Other Exposure to Infectious Disease :   Unknown   Schoenike , Andrea - 6/16/2020 11:33 AM CDT  
None

## 2023-07-04 NOTE — OB RN DELIVERY SUMMARY - NSSELHIDDEN_OBGYN_ALL_OB_FT
[NS_DeliveryAttending1_OBGYN_ALL_OB_FT:[id number]],[NS_DeliveryAttending2_OBGYN_ALL_OB_FT:[id number]],[NS_DeliveryAssist1_OBGYN_ALL_OB_FT:[id number]],[NS_DeliveryAssist2_OBGYN_ALL_OB_FT:[id number]],[NS_DeliveryRN_OBGYN_ALL_OB_FT:[id number]]

## 2023-07-04 NOTE — OB PROVIDER TRIAGE NOTE - NSOBPROVIDERNOTE_OBGYN_ALL_OB_FT
31yo  @39w1d in latent labor. Patient is having painful, regular contractions.  -EFM/toco  -patient to ambulate x 2 hrs and will be re-examined for cervical dilatation    d/w Dr. Amarilis Mcfadden NP

## 2023-07-04 NOTE — OB PROVIDER TRIAGE NOTE - NSOBPROVIDERNOTE_OBGYN_ALL_OB_FT
33yo  @39w1d in latent labor. Patient is having painful, regular contractions and has made cervical change since prior exam.   - discharge home  - patient counseled to return if she feels her contractions have increased in frequency    D/W Dr. KAYA Braga, PGY-1

## 2023-07-04 NOTE — OB PROVIDER TRIAGE NOTE - HISTORY OF PRESENT ILLNESS
31yo  @39w1d presents for rule out labor. Patient has been having contractions since 8pm 7/3 that have been increasing in frequency and intensity with pain now 8/10. Patient was seen on Friday, exam was 0cm at that time.  +FM. -VB. -LOF. +Ctx.  EFW:   GBS: negative    OBHx: no complications during this pregnancy  GynHx: denies history of fibroids, cysts, STD  PMHx: denies  SHx: tonsillectomy  Meds: none  All: penicillin  Psych: denies depression or anxiety

## 2023-07-04 NOTE — OB RN PATIENT PROFILE - NSPRENATALGBS_OBGYN_ALL_OB_GET_DAYS
Temp 97.8F, HR 66bpm, /88, RR 16, O2 sat 97% RA                            12.6   9.7   )-----------( 163      ( 09 Oct 2017 12:20 )             37.8       10-09    145  |  107  |  19  ----------------------------<  101<H>  4.2   |  24  |  1.09    Ca    9.8      09 Oct 2017 12:20    TPro  7.6  /  Alb  4.3  /  TBili  1.6<H>  /  DBili  x   /  AST  21  /  ALT  28  /  AlkPhos  89  10-09      PT/INR - ( 09 Oct 2017 12:20 )   PT: 14.2 sec;   INR: 1.27          PTT - ( 09 Oct 2017 12:20 )  PTT:28.5 sec    CARDIAC MARKERS ( 09 Oct 2017 12:20 )  x     / x     / 134 U/L / x     / 1.7 ng/mL        EKG NSR TWI II, II, V4-V6, LVH 20

## 2023-07-05 ENCOUNTER — APPOINTMENT (OUTPATIENT)
Dept: OBGYN | Facility: CLINIC | Age: 33
End: 2023-07-05

## 2023-07-05 RX ORDER — IBUPROFEN 200 MG
600 TABLET ORAL EVERY 6 HOURS
Refills: 0 | Status: DISCONTINUED | OUTPATIENT
Start: 2023-07-05 | End: 2023-07-06

## 2023-07-05 RX ADMIN — Medication 600 MILLIGRAM(S): at 06:54

## 2023-07-05 RX ADMIN — Medication 600 MILLIGRAM(S): at 12:17

## 2023-07-05 RX ADMIN — Medication 600 MILLIGRAM(S): at 12:50

## 2023-07-05 RX ADMIN — Medication 975 MILLIGRAM(S): at 16:30

## 2023-07-05 RX ADMIN — Medication 975 MILLIGRAM(S): at 22:10

## 2023-07-05 RX ADMIN — Medication 1 TABLET(S): at 12:18

## 2023-07-05 RX ADMIN — Medication 600 MILLIGRAM(S): at 20:00

## 2023-07-05 RX ADMIN — Medication 975 MILLIGRAM(S): at 15:59

## 2023-07-05 RX ADMIN — Medication 975 MILLIGRAM(S): at 09:15

## 2023-07-05 RX ADMIN — Medication 975 MILLIGRAM(S): at 21:13

## 2023-07-05 RX ADMIN — SODIUM CHLORIDE 3 MILLILITER(S): 9 INJECTION INTRAMUSCULAR; INTRAVENOUS; SUBCUTANEOUS at 05:03

## 2023-07-05 RX ADMIN — Medication 600 MILLIGRAM(S): at 06:21

## 2023-07-05 RX ADMIN — Medication 30 MILLIGRAM(S): at 01:57

## 2023-07-05 RX ADMIN — Medication 975 MILLIGRAM(S): at 08:45

## 2023-07-05 RX ADMIN — Medication 30 MILLIGRAM(S): at 01:18

## 2023-07-05 RX ADMIN — Medication 600 MILLIGRAM(S): at 18:57

## 2023-07-05 NOTE — PROGRESS NOTE ADULT - SUBJECTIVE AND OBJECTIVE BOX
Patient seen and examined at bedside, no acute overnight events. No acute complaints, pain well controlled. Patient is ambulating, voiding spontaneously, passing gas, and tolerating regular diet. Denies CP, SOB, N/V, HA, blurred vision, epigastric pain.    Vital Signs Last 24 Hours  T(C): 37 (07-05-23 @ 03:00), Max: 37.3 (07-04-23 @ 21:00)  HR: 69 (07-05-23 @ 03:00) (60 - 92)  BP: 125/74 (07-05-23 @ 03:00) (90/51 - 142/68)  RR: 17 (07-05-23 @ 03:00) (16 - 18)  SpO2: 97% (07-05-23 @ 03:00) (77% - 100%)    Physical Exam:  General: NAD  Abdomen: Soft, non-tender, non-distended, fundus firm  Pelvic: Lochia wnl    Labs:    Blood Type: A Positive  Antibody Screen: Negative  RPR: Negative               13.7   11.72 )-----------( 139      ( 07-04 @ 14:30 )             39.1                12.7   10.29 )-----------( 147      ( 06-21 @ 15:48 )             35.9

## 2023-07-05 NOTE — PROGRESS NOTE ADULT - ASSESSMENT
- Continue with po analgesia  - Increase ambulation  - Continue regular diet  - IV lock  - No labs    KAYA Braga, PGY-1

## 2023-07-05 NOTE — PROGRESS NOTE ADULT - ATTENDING COMMENTS
pt seen and examined. overall doing well. routine pp care. ambulation encouraged. evaluate for dc home tomorrow.    amadeo zaman

## 2023-07-06 ENCOUNTER — APPOINTMENT (OUTPATIENT)
Dept: OBGYN | Facility: HOSPITAL | Age: 33
End: 2023-07-06

## 2023-07-06 ENCOUNTER — TRANSCRIPTION ENCOUNTER (OUTPATIENT)
Age: 33
End: 2023-07-06

## 2023-07-06 VITALS
OXYGEN SATURATION: 98 % | HEART RATE: 73 BPM | TEMPERATURE: 99 F | DIASTOLIC BLOOD PRESSURE: 91 MMHG | RESPIRATION RATE: 18 BRPM | SYSTOLIC BLOOD PRESSURE: 129 MMHG

## 2023-07-06 DIAGNOSIS — Z3A.39 39 WEEKS GESTATION OF PREGNANCY: ICD-10-CM

## 2023-07-06 DIAGNOSIS — E28.2 POLYCYSTIC OVARIAN SYNDROME: ICD-10-CM

## 2023-07-06 DIAGNOSIS — O47.1 FALSE LABOR AT OR AFTER 37 COMPLETED WEEKS OF GESTATION: ICD-10-CM

## 2023-07-06 DIAGNOSIS — Z86.16 PERSONAL HISTORY OF COVID-19: ICD-10-CM

## 2023-07-06 DIAGNOSIS — O99.283 ENDOCRINE, NUTRITIONAL AND METABOLIC DISEASES COMPLICATING PREGNANCY, THIRD TRIMESTER: ICD-10-CM

## 2023-07-06 PROCEDURE — 86900 BLOOD TYPING SEROLOGIC ABO: CPT

## 2023-07-06 PROCEDURE — 86780 TREPONEMA PALLIDUM: CPT

## 2023-07-06 PROCEDURE — 85025 COMPLETE CBC W/AUTO DIFF WBC: CPT

## 2023-07-06 PROCEDURE — 59050 FETAL MONITOR W/REPORT: CPT

## 2023-07-06 PROCEDURE — 86850 RBC ANTIBODY SCREEN: CPT

## 2023-07-06 PROCEDURE — 36415 COLL VENOUS BLD VENIPUNCTURE: CPT

## 2023-07-06 PROCEDURE — 86769 SARS-COV-2 COVID-19 ANTIBODY: CPT

## 2023-07-06 PROCEDURE — 86901 BLOOD TYPING SEROLOGIC RH(D): CPT

## 2023-07-06 RX ORDER — LANOLIN
1 OINTMENT (GRAM) TOPICAL
Qty: 0 | Refills: 0 | DISCHARGE
Start: 2023-07-06

## 2023-07-06 RX ORDER — DIBUCAINE 1 %
1 OINTMENT (GRAM) RECTAL
Qty: 0 | Refills: 0 | DISCHARGE
Start: 2023-07-06

## 2023-07-06 RX ORDER — AER TRAVELER 0.5 G/1
1 SOLUTION RECTAL; TOPICAL
Qty: 0 | Refills: 0 | DISCHARGE
Start: 2023-07-06

## 2023-07-06 RX ORDER — IBUPROFEN 200 MG
1 TABLET ORAL
Qty: 0 | Refills: 0 | DISCHARGE
Start: 2023-07-06

## 2023-07-06 RX ORDER — MAGNESIUM HYDROXIDE 400 MG/1
30 TABLET, CHEWABLE ORAL
Qty: 0 | Refills: 0 | DISCHARGE
Start: 2023-07-06

## 2023-07-06 RX ORDER — SIMETHICONE 80 MG/1
1 TABLET, CHEWABLE ORAL
Qty: 0 | Refills: 0 | DISCHARGE
Start: 2023-07-06

## 2023-07-06 RX ORDER — ACETAMINOPHEN 500 MG
3 TABLET ORAL
Qty: 0 | Refills: 0 | DISCHARGE
Start: 2023-07-06

## 2023-07-06 RX ORDER — BENZOCAINE 10 %
1 GEL (GRAM) MUCOUS MEMBRANE
Qty: 0 | Refills: 0 | DISCHARGE
Start: 2023-07-06

## 2023-07-06 RX ORDER — PRAMOXINE HYDROCHLORIDE 150 MG/15G
1 AEROSOL, FOAM RECTAL
Qty: 0 | Refills: 0 | DISCHARGE
Start: 2023-07-06

## 2023-07-06 RX ORDER — HYDROCORTISONE 1 %
1 OINTMENT (GRAM) TOPICAL
Qty: 0 | Refills: 0 | DISCHARGE
Start: 2023-07-06

## 2023-07-06 RX ADMIN — Medication 975 MILLIGRAM(S): at 08:50

## 2023-07-06 RX ADMIN — Medication 600 MILLIGRAM(S): at 01:30

## 2023-07-06 RX ADMIN — Medication 975 MILLIGRAM(S): at 04:02

## 2023-07-06 RX ADMIN — Medication 600 MILLIGRAM(S): at 06:09

## 2023-07-06 RX ADMIN — Medication 975 MILLIGRAM(S): at 08:18

## 2023-07-06 RX ADMIN — Medication 600 MILLIGRAM(S): at 00:32

## 2023-07-06 RX ADMIN — Medication 975 MILLIGRAM(S): at 03:12

## 2023-07-06 RX ADMIN — Medication 600 MILLIGRAM(S): at 06:52

## 2023-07-06 NOTE — DISCHARGE NOTE OB - PLAN OF CARE
Call the office/return to hospital if you have any of the following:  - headache that does not go away with Tylenol  - vision changes  - severe nausea or vomiting  - fevers or chills  - heavy vaginal bleeding (soaking 2 pads per hour)  - feelings of sadness or hurting yourself or others

## 2023-07-06 NOTE — PROGRESS NOTE ADULT - SUBJECTIVE AND OBJECTIVE BOX
Patient seen and examined at bedside, no acute overnight events. No acute complaints, pain well controlled.  Patient is ambulating, voiding spontaneously, passing gas, and tolerating regular diet. Pt is breast and bottle feeding her baby. Lactation consult at bedside during my visit.   Lochia is normal.     Vital Signs Last 24 Hours  T(C): 37 (07-06-23 @ 09:13), Max: 37 (07-06-23 @ 09:13)  HR: 73 (07-06-23 @ 09:13) (59 - 73)  BP: 129/91 (07-06-23 @ 09:13) (113/70 - 135/79)  RR: 18 (07-06-23 @ 09:13) (18 - 18)  SpO2: 98% (07-06-23 @ 09:13) (98% - 100%)    Physical Exam:  General: NAD  Abdomen: Soft, non-tender, non-distended, fundus firm  Ext: NTBL    Labs:  Blood type: A Positive  RPR: Negative                          13.7   11.72<H> >-----------< 139<L>    ( 07-04 @ 14:30 )             39.1                  MEDICATIONS  (STANDING):  acetaminophen     Tablet .. 975 milliGRAM(s) Oral <User Schedule>  diphtheria/tetanus/pertussis (acellular) Vaccine (Adacel) 0.5 milliLiter(s) IntraMuscular once  ibuprofen  Tablet. 600 milliGRAM(s) Oral every 6 hours  lactated ringers Bolus 500 milliLiter(s) IV Bolus once  lactated ringers. 1000 milliLiter(s) (125 mL/Hr) IV Continuous <Continuous>  oxytocin Infusion 41.667 milliUNIT(s)/Min (125 mL/Hr) IV Continuous <Continuous>  oxytocin Infusion 333.333 milliUNIT(s)/Min (1000 mL/Hr) IV Continuous <Continuous>  prenatal multivitamin 1 Tablet(s) Oral daily  sodium chloride 0.9% lock flush 3 milliLiter(s) IV Push every 8 hours    MEDICATIONS  (PRN):  benzocaine 20%/menthol 0.5% Spray 1 Spray(s) Topical every 6 hours PRN for Perineal discomfort  dibucaine 1% Ointment 1 Application(s) Topical every 6 hours PRN Perineal discomfort  diphenhydrAMINE 25 milliGRAM(s) Oral every 6 hours PRN Pruritus  hydrocortisone 1% Cream 1 Application(s) Topical every 6 hours PRN Moderate Pain (4-6)  lanolin Ointment 1 Application(s) Topical every 6 hours PRN nipple soreness  magnesium hydroxide Suspension 30 milliLiter(s) Oral two times a day PRN Constipation  oxyCODONE    IR 5 milliGRAM(s) Oral every 3 hours PRN Moderate to Severe Pain (4-10)  oxyCODONE    IR 5 milliGRAM(s) Oral once PRN Moderate to Severe Pain (4-10)  pramoxine 1%/zinc 5% Cream 1 Application(s) Topical every 4 hours PRN Moderate Pain (4-6)  simethicone 80 milliGRAM(s) Chew every 4 hours PRN Gas  witch hazel Pads 1 Application(s) Topical every 4 hours PRN Perineal discomfort

## 2023-07-06 NOTE — PROGRESS NOTE ADULT - ASSESSMENT
s/p , stable    - Continue with oral pain medication per pain protocol   - Encourage  Ambulation  - Regular diet  - No Labs   - DVT ppx: SCDs only when not ambulating  -  Discharge - meeting all milestones     Postpartum preeclampsia, hemorrhage and PPD precautions were reviewed.   Follow up in the office in 6 weeks for postpartum visit.     Claudio GARDINER

## 2023-07-06 NOTE — DISCHARGE NOTE OB - CARE PROVIDER_API CALL
Anabell Bansal  Obstetrics and Gynecology  [address] [place] [address], [address]  [place] [address]  Phone: [phone]  Fax: [phone]  Established Patient  Follow Up Time:

## 2023-07-06 NOTE — DISCHARGE NOTE OB - MEDICATION SUMMARY - MEDICATIONS TO TAKE
I will START or STAY ON the medications listed below when I get home from the hospital:    ibuprofen 600 mg oral tablet  -- 1 tab(s) by mouth every 6 hours  -- Indication: For Postpartum pain    acetaminophen 325 mg oral tablet  -- 3 tab(s) by mouth every 8 hours as needed for  mild pain  -- Indication: For Postpartum pain    magnesium hydroxide 8% oral suspension  -- 30 milliliter(s) by mouth 2 times a day As needed Constipation  -- Indication: For Constipation    benzocaine 20% topical spray  -- 1 Apply on skin to affected area every 6 hours As needed for Perineal discomfort  -- Indication: For Perineal discomfort    dibucaine 1% topical ointment  -- 1 Apply on skin to affected area every 6 hours As needed Perineal discomfort  -- Indication: For Perineal discomfort    pramoxine 1% topical cream  -- 1 Apply on skin to affected area every 4 hours As needed Moderate Pain (4-6)  -- Indication: For Perineal discomfort    hydrocortisone 1% topical cream  -- 1 Apply on skin to affected area every 6 hours As needed Moderate Pain (4-6)  -- Indication: For Perineal discomfort    lanolin topical ointment  -- 1 Apply on skin to affected area every 6 hours As needed nipple soreness  -- Indication: For Nipple soreness    witch hazel 50% topical pad  -- 1 Apply on skin to affected area every 4 hours As needed Perineal discomfort  -- Indication: For Perineal discomfort    simethicone 80 mg oral tablet, chewable  -- 1 tab(s) by mouth every 4 hours As needed Gas  -- Indication: For Gas

## 2023-07-06 NOTE — DISCHARGE NOTE OB - PATIENT PORTAL LINK FT
You can access the FollowMyHealth Patient Portal offered by [facility] by registering at the following website: [email or web address]. By joining [employer]’s FollowMyHealth portal, you will also be able to view your health information using other applications (apps) compatible with our system.

## 2023-07-06 NOTE — DISCHARGE NOTE OB - NS MD DC FALL RISK RISK
For information on Fall & Injury Prevention, visit: [email or web address] OR  [email or web address] OR  https://www.cdc.gov/steadi/patient.html

## 2023-07-06 NOTE — DISCHARGE NOTE OB - CARE PLAN
Principal Discharge DX:	Normal vaginal delivery  Assessment and plan of treatment:	Call the office/return to hospital if you have any of the following:  - headache that does not go away with Tylenol  - vision changes  - severe nausea or vomiting  - fevers or chills  - heavy vaginal bleeding (soaking 2 pads per hour)  - feelings of sadness or hurting yourself or others   1

## 2023-07-21 ENCOUNTER — APPOINTMENT (OUTPATIENT)
Dept: OBGYN | Facility: CLINIC | Age: 33
End: 2023-07-21

## 2023-07-27 ENCOUNTER — NON-APPOINTMENT (OUTPATIENT)
Age: 33
End: 2023-07-27

## 2023-07-28 ENCOUNTER — APPOINTMENT (OUTPATIENT)
Dept: OBGYN | Facility: CLINIC | Age: 33
End: 2023-07-28
Payer: COMMERCIAL

## 2023-07-28 PROCEDURE — 0503F POSTPARTUM CARE VISIT: CPT

## 2023-07-28 NOTE — HISTORY OF PRESENT ILLNESS
[Delivery Date: ___] : on [unfilled] [] : delivered by vaginal delivery [Female] : Delivery History: baby girl [Wt. ___] : weighing [unfilled] [Breastfeeding] : currently nursing [None] : No associated symptoms are reported [Back to Normal] : is back to normal in size [Mild] : mild vaginal bleeding [Normal] : the vagina was normal [Doing Well] : is doing well [No Sign of Infection] : is showing no signs of infection [Abdominal Pain] : abdominal pain [FreeTextEntry8] : pelvic pain and light pink spotting with lochia yesterday. Pt had trouble walking. She is s/p  on 2023 [de-identified] : Delivered by SA. [de-identified] : c/o pelvic pain yesterday- today improved [de-identified] : RTO for 6 week check.

## 2023-08-01 NOTE — ED ADULT TRIAGE NOTE - NSTRIAGECARE_GEN_A_ER
[facility]  DATE OF VISIT : 2023    Patient : Patricia Rosas   Age : 40 y.o.  : 1977   MRN : [id number]   ______________________________________________________________________    Chief Complaint :   Chief Complaint   Patient presents with    6 Month Follow-Up     Patient is here today for a 6 month follow up for TSH and prediabetes. A1C checked today in office and was 6.1. Had a uti a month ago and was checked at work at [employer] and was given antibiotics and got re tested again after she finished her antibiotics and she was clear. Needs refills for her Omeprazole today. Vaginal Discharge     One week after her menstrual cycle she gets minor lower abdominal pelvic pain and has a clear see through discharge that comes out at times and is just concerned. No odor or abnormal color in discharge. HPI : Patricia Rosas is 40 y.o. female who presented to the clinic today for office visit. Hypothyroidism: Levothyroxine 100mcg daily. Last TSH 23: 2.180     Prediabetes: Last A1C 6.3. 2023. Recent UTI: tested during a health fair at work, was treated with abx, Had repeat UA with resolution. No further symptoms. Complaining of intermittent thin clear vaginal discharge. Patient states that about a week before or a week after her. She develops distended clear vaginal discharge that has no smell to it. Denies any vaginal, itching or discomfort. Denies any vaginal bleeding besides her menses. Last mammo . Had Mammo ordered on 2023 but patient has not gotten yet. She does require mammo + US yearly for heterogeneously dense breast tissue. I reviewed the patient's past medications, allergies and past medical history during this visit.     Past Medical History :    Past Medical History:   Diagnosis Date    Gestational diabetes 2015    Gestational diabetes 2016    States Dr told her to check blood glucose once a week, diet
Face Mask

## 2023-08-14 ENCOUNTER — APPOINTMENT (OUTPATIENT)
Dept: OBGYN | Facility: CLINIC | Age: 33
End: 2023-08-14
Payer: COMMERCIAL

## 2023-08-14 VITALS
SYSTOLIC BLOOD PRESSURE: 112 MMHG | HEIGHT: 61 IN | WEIGHT: 127 LBS | DIASTOLIC BLOOD PRESSURE: 68 MMHG | BODY MASS INDEX: 23.98 KG/M2

## 2023-08-14 DIAGNOSIS — M62.89 OTHER SPECIFIED DISORDERS OF MUSCLE: ICD-10-CM

## 2023-08-14 PROCEDURE — 0503F POSTPARTUM CARE VISIT: CPT

## 2023-08-14 NOTE — HISTORY OF PRESENT ILLNESS
[Delivery Date: ___] : on [unfilled] [] : delivered by vaginal delivery [Female] : Delivery History: baby girl [Wt. ___] : weighing [unfilled] [Postpartum Follow Up] : postpartum follow up [Breastfeeding] : currently nursing [Back to Normal] : is back to normal in size [Normal] : the vagina was normal [None] : None [FreeTextEntry8] : 6wk ppa 23 - f - 6.0 - breast -  - SA, -depression, -mastits, baby doing well [de-identified] : abd soft, nt, nd [de-identified] : no depression, baby is doing well.  [de-identified] : 31 yo pt s/p  2023. -Pt Rx for pelvic floor PT at pt request -RTO 2 months for pelvic exam

## 2023-08-14 NOTE — END OF VISIT
[FreeTextEntry4] : I, Dorothy Calvert, acted as a scribe on behalf of Dr. Latasha Olmos on 08/14/2023.

## 2023-09-29 NOTE — ED STATDOCS - DR. NAME
Justin Perilesional Excision Additional Text (Leave Blank If You Do Not Want): The margin was drawn around the clinically apparent lesion. Incisions were then made along these lines to the appropriate tissue plane and the lesion was extirpated.

## 2023-10-27 NOTE — OB RN DELIVERY SUMMARY - NS_TIMERUPTUREDADMITTED_OBGYN_ALL_OB_FT
[facility]    CHIEF COMPLAINT: Patient is a 79y old  Male who presents with a chief complaint of fever and weakness (26 Oct 2023 18:26)      Follow Up: [ ] Chest Pain      [ ] Dyspnea     [ ] Palpitations    [ ] Atrial Fibrillation     [ ] Ventricular Dysrhythmia    [ ] Abnormal EKG                      [ ] Abnormal Cardiac Enzymes     [ ] Valvular Disease    HPI:  79-year-old male presents to the hospital by ambulance from home.  Son at the bedside dates patient has history of HTN, DM, enlarged prostate, PPM, is bedbound, for the past year has lost 40 to 50 pounds, for the past month not eating or drinking, on Wednesday developed fever, Tmax 101 °F, patient has chronic wounds of his bilateral heels, patient states that he has body pains, burning with urination. Pt does not go to doctor on regualar basis per son and does phone visits.   (11 Oct 2023 07:02)    PAST MEDICAL & SURGICAL HISTORY:  Diabetes      Benign essential HTN      Pacemaker      History of BPH      Diabetic foot ulcers        MEDICATIONS  (STANDING):  atorvastatin 80 milliGRAM(s) Oral at bedtime  bethanechol 25 milliGRAM(s) Oral two times a day  chlorhexidine 4% Liquid 1 Application(s) Topical <User Schedule>  dextrose 50% Injectable 12.5 Gram(s) IV Push once  dextrose 50% Injectable 25 Gram(s) IV Push once  dextrose 50% Injectable 25 Gram(s) IV Push once  gabapentin 300 milliGRAM(s) Oral two times a day  glucagon  Injectable 1 milliGRAM(s) IntraMuscular once  insulin lispro (ADMELOG) corrective regimen sliding scale   SubCutaneous three times a day before meals  insulin lispro (ADMELOG) corrective regimen sliding scale   SubCutaneous at bedtime  lactobacillus acidophilus 1 Tablet(s) Oral two times a day with meals  midodrine. 10 milliGRAM(s) Oral three times a day  pantoprazole    Tablet 40 milliGRAM(s) Oral before breakfast  tamsulosin 0.4 milliGRAM(s) Oral at bedtime  vancomycin  IVPB 500 milliGRAM(s) IV Intermittent every 12 hours    MEDICATIONS  (PRN):  acetaminophen     Tablet .. 650 milliGRAM(s) Oral every 6 hours PRN Temp greater or equal to 38C (100.4F), Moderate Pain (4 - 6)  dextrose Oral Gel 15 Gram(s) Oral once PRN Blood Glucose LESS THAN 70 milliGRAM(s)/deciliter  loperamide 2 milliGRAM(s) Oral three times a day PRN Diarrhea  sodium chloride 0.9% lock flush 10 milliLiter(s) IV Push every 1 hour PRN Pre/post blood products, medications, blood draw, and to maintain line patency    Allergies    No Known Allergies    Intolerances        REVIEW OF SYSTEMS:    CONSTITUTIONAL: No weakness, fevers or chills.   EYES/ENT: No visual changes;    NECK: No pain or stiffness  RESPIRATORY: No cough, wheezing, No shortness of breath  CARDIOVASCULAR: No chest pain or palpitations  GASTROINTESTINAL: No abdominal pain, or hematochezia.  GENITOURINARY: No dysuria orhematuria  NEUROLOGICAL: No numbness or weakness  SKIN: No itching, burning, rashes  All other review of systems is negative unless indicated above    Vital Signs Last 24 Hrs  T(C): 36.8 (27 Oct 2023 05:09), Max: 37 (26 Oct 2023 12:33)  T(F): 98.3 (27 Oct 2023 05:09), Max: 98.6 (26 Oct 2023 12:33)  HR: 59 (27 Oct 2023 07:57) (58 - 69)  BP: 125/58 (27 Oct 2023 05:09) (120/56 - 125/58)  BP(mean): --  RR: 17 (27 Oct 2023 05:09) (17 - 17)  SpO2: 93% (27 Oct 2023 05:09) (93% - 98%)    Parameters below as of 27 Oct 2023 05:09  Patient On (Oxygen Delivery Method): room air      I&O's Summary    26 Oct 2023 07:01  -  27 Oct 2023 07:00  --------------------------------------------------------  IN: 0 mL / OUT: 800 mL / NET: -800 mL    PHYSICAL EXAM:  Constitutional: NAD  Neurological: Alert, no focal deficits  HEENT: no JVD, EOMI  Cardiovascular: Regular, S1 and S2, no murmur  Pulmonary: breath sounds bilaterally  Gastrointestinal: Bowel Sounds present, soft, nontender  EXT:  no peripheral edema  Skin: No rashes.  Psych:  Mood calm  LABS: All Labs Reviewed:                          7.7    7.51  )-----------( 263      ( 26 Oct 2023 07:30 )             25.6     10-26    139  |  105  |  19  ----------------------------<  189<H>  4.0   |  31  |  0.86    Ca    8.1<L>      26 Oct 2023 07:30    · Assessment    79M with PMH HTN, PPM, DM, foot ulcers , presents to the hospital by ambulance from home.  Son at the bedside dates patient has history of HTN, DM, enlarged prostate, PPM, is bedbound, for the past year has lost 40 to 50 pounds, for the past month not eating or drinking, on Wednesday developed fever, Tmax 101 °F, patient has chronic wounds of his bilateral heels, patient states that he has body pains, burning with urination. Pt does not go to doctor on regular basis per son and does phone visits.  +Bacteremia.        S/p GISSELL: No vegetations    TTE: CONCLUSIONS:    1. Technically difficult image quality.   2. There is abnormal septal activation, likely from a paced rhythm. There is likely superimposed anterior and septal hypokinesis. Limited views and limited endocardial definition make further interpretation limited.   3. The right ventricle is not well visualized. Normal right ventricular cavity size and reduced systolic function.   4. The left atrium is mildly dilated in size.   5. Right atrium was not well visualized.   6. Mild calcification of the aortic valve leaflets.   7. Tricuspid valve was not well visualized.   8. Aortic valve was not well visualized.    1. Multi nonhealing wound  +NM suspicious for OM.  s/p 5th metatarsal amputation POD1  S/p recent debridement with no complication.     pt denied CP    2. HLD continue Statin  Pt has seen Dave Panchal [phone] and Curtis ferris [phone]  will follow prn, call if needed                   [facility]    CHIEF COMPLAINT: Patient is a 79y old  Male who presents with a chief complaint of fever and weakness (26 Oct 2023 18:26)      Follow Up: [ ] Chest Pain      [ ] Dyspnea     [ ] Palpitations    [ ] Atrial Fibrillation     [ ] Ventricular Dysrhythmia    [ ] Abnormal EKG                      [ ] Abnormal Cardiac Enzymes     [ ] Valvular Disease    HPI:  79-year-old male presents to the hospital by ambulance from home.  Son at the bedside dates patient has history of HTN, DM, enlarged prostate, PPM, is bedbound, for the past year has lost 40 to 50 pounds, for the past month not eating or drinking, on Wednesday developed fever, Tmax 101 °F, patient has chronic wounds of his bilateral heels, patient states that he has body pains, burning with urination. Pt does not go to doctor on regualar basis per son and does phone visits.   (11 Oct 2023 07:02)    PAST MEDICAL & SURGICAL HISTORY:  Diabetes      Benign essential HTN      Pacemaker      History of BPH      Diabetic foot ulcers        MEDICATIONS  (STANDING):  atorvastatin 80 milliGRAM(s) Oral at bedtime  bethanechol 25 milliGRAM(s) Oral two times a day  chlorhexidine 4% Liquid 1 Application(s) Topical <User Schedule>  dextrose 50% Injectable 12.5 Gram(s) IV Push once  dextrose 50% Injectable 25 Gram(s) IV Push once  dextrose 50% Injectable 25 Gram(s) IV Push once  gabapentin 300 milliGRAM(s) Oral two times a day  glucagon  Injectable 1 milliGRAM(s) IntraMuscular once  insulin lispro (ADMELOG) corrective regimen sliding scale   SubCutaneous three times a day before meals  insulin lispro (ADMELOG) corrective regimen sliding scale   SubCutaneous at bedtime  lactobacillus acidophilus 1 Tablet(s) Oral two times a day with meals  midodrine. 10 milliGRAM(s) Oral three times a day  pantoprazole    Tablet 40 milliGRAM(s) Oral before breakfast  tamsulosin 0.4 milliGRAM(s) Oral at bedtime  vancomycin  IVPB 500 milliGRAM(s) IV Intermittent every 12 hours    MEDICATIONS  (PRN):  acetaminophen     Tablet .. 650 milliGRAM(s) Oral every 6 hours PRN Temp greater or equal to 38C (100.4F), Moderate Pain (4 - 6)  dextrose Oral Gel 15 Gram(s) Oral once PRN Blood Glucose LESS THAN 70 milliGRAM(s)/deciliter  loperamide 2 milliGRAM(s) Oral three times a day PRN Diarrhea  sodium chloride 0.9% lock flush 10 milliLiter(s) IV Push every 1 hour PRN Pre/post blood products, medications, blood draw, and to maintain line patency    Allergies    No Known Allergies    Intolerances        REVIEW OF SYSTEMS:    CONSTITUTIONAL: No weakness, fevers or chills.   EYES/ENT: No visual changes;    NECK: No pain or stiffness  RESPIRATORY: No cough, wheezing, No shortness of breath  CARDIOVASCULAR: No chest pain or palpitations  GASTROINTESTINAL: No abdominal pain, or hematochezia.  GENITOURINARY: No dysuria orhematuria  NEUROLOGICAL: No numbness or weakness  SKIN: No itching, burning, rashes  All other review of systems is negative unless indicated above    Vital Signs Last 24 Hrs  T(C): 36.8 (27 Oct 2023 05:09), Max: 37 (26 Oct 2023 12:33)  T(F): 98.3 (27 Oct 2023 05:09), Max: 98.6 (26 Oct 2023 12:33)  HR: 59 (27 Oct 2023 07:57) (58 - 69)  BP: 125/58 (27 Oct 2023 05:09) (120/56 - 125/58)  BP(mean): --  RR: 17 (27 Oct 2023 05:09) (17 - 17)  SpO2: 93% (27 Oct 2023 05:09) (93% - 98%)    Parameters below as of 27 Oct 2023 05:09  Patient On (Oxygen Delivery Method): room air      I&O's Summary    26 Oct 2023 07:01  -  27 Oct 2023 07:00  --------------------------------------------------------  IN: 0 mL / OUT: 800 mL / NET: -800 mL    PHYSICAL EXAM:  Constitutional: NAD  Neurological: Alert, no focal deficits  HEENT: no JVD, EOMI  Cardiovascular: Regular, S1 and S2, no murmur  Pulmonary: breath sounds bilaterally  Gastrointestinal: Bowel Sounds present, soft, nontender  EXT:  no peripheral edema  Skin: No rashes.  Psych:  Mood calm  LABS: All Labs Reviewed:                          7.7    7.51  )-----------( 263      ( 26 Oct 2023 07:30 )             25.6     10-26    139  |  105  |  19  ----------------------------<  189<H>  4.0   |  31  |  0.86    Ca    8.1<L>      26 Oct 2023 07:30    · Assessment    79M with PMH HTN, PPM, DM, foot ulcers , presents to the hospital by ambulance from home.  Son at the bedside dates patient has history of HTN, DM, enlarged prostate, PPM, is bedbound, for the past year has lost 40 to 50 pounds, for the past month not eating or drinking, on Wednesday developed fever, Tmax 101 °F, patient has chronic wounds of his bilateral heels, patient states that he has body pains, burning with urination. Pt does not go to doctor on regular basis per son and does phone visits.  +Bacteremia.        S/p GISSELL: No vegetations    TTE: CONCLUSIONS:    1. Technically difficult image quality.   2. There is abnormal septal activation, likely from a paced rhythm. There is likely superimposed anterior and septal hypokinesis. Limited views and limited endocardial definition make further interpretation limited.   3. The right ventricle is not well visualized. Normal right ventricular cavity size and reduced systolic function.   4. The left atrium is mildly dilated in size.   5. Right atrium was not well visualized.   6. Mild calcification of the aortic valve leaflets.   7. Tricuspid valve was not well visualized.   8. Aortic valve was not well visualized.    1. Multi nonhealing wound  +NM suspicious for OM.  s/p 5th metatarsal amputation POD1  S/p recent debridement with no complication.     pt denied CP    2. HLD continue Statin  Pt has seen Daev Panchal [phone] and Curtis ferris [phone]  will follow prn, call if needed                   [facility]    CHIEF COMPLAINT: Patient is a 79y old  Male who presents with a chief complaint of fever and weakness (26 Oct 2023 18:26)      Follow Up: [ ] Chest Pain      [ ] Dyspnea     [ ] Palpitations    [ ] Atrial Fibrillation     [ ] Ventricular Dysrhythmia    [ ] Abnormal EKG                      [ ] Abnormal Cardiac Enzymes     [ ] Valvular Disease    HPI:  79-year-old male presents to the hospital by ambulance from home.  Son at the bedside dates patient has history of HTN, DM, enlarged prostate, PPM, is bedbound, for the past year has lost 40 to 50 pounds, for the past month not eating or drinking, on Wednesday developed fever, Tmax 101 °F, patient has chronic wounds of his bilateral heels, patient states that he has body pains, burning with urination. Pt does not go to doctor on regualar basis per son and does phone visits.   (11 Oct 2023 07:02)    PAST MEDICAL & SURGICAL HISTORY:  Diabetes      Benign essential HTN      Pacemaker      History of BPH      Diabetic foot ulcers        MEDICATIONS  (STANDING):  atorvastatin 80 milliGRAM(s) Oral at bedtime  bethanechol 25 milliGRAM(s) Oral two times a day  chlorhexidine 4% Liquid 1 Application(s) Topical <User Schedule>  dextrose 50% Injectable 12.5 Gram(s) IV Push once  dextrose 50% Injectable 25 Gram(s) IV Push once  dextrose 50% Injectable 25 Gram(s) IV Push once  gabapentin 300 milliGRAM(s) Oral two times a day  glucagon  Injectable 1 milliGRAM(s) IntraMuscular once  insulin lispro (ADMELOG) corrective regimen sliding scale   SubCutaneous three times a day before meals  insulin lispro (ADMELOG) corrective regimen sliding scale   SubCutaneous at bedtime  lactobacillus acidophilus 1 Tablet(s) Oral two times a day with meals  midodrine. 10 milliGRAM(s) Oral three times a day  pantoprazole    Tablet 40 milliGRAM(s) Oral before breakfast  tamsulosin 0.4 milliGRAM(s) Oral at bedtime  vancomycin  IVPB 500 milliGRAM(s) IV Intermittent every 12 hours    MEDICATIONS  (PRN):  acetaminophen     Tablet .. 650 milliGRAM(s) Oral every 6 hours PRN Temp greater or equal to 38C (100.4F), Moderate Pain (4 - 6)  dextrose Oral Gel 15 Gram(s) Oral once PRN Blood Glucose LESS THAN 70 milliGRAM(s)/deciliter  loperamide 2 milliGRAM(s) Oral three times a day PRN Diarrhea  sodium chloride 0.9% lock flush 10 milliLiter(s) IV Push every 1 hour PRN Pre/post blood products, medications, blood draw, and to maintain line patency    Allergies    No Known Allergies    Intolerances        REVIEW OF SYSTEMS:    CONSTITUTIONAL: No weakness, fevers or chills.   EYES/ENT: No visual changes;    NECK: No pain or stiffness  RESPIRATORY: No cough, wheezing, No shortness of breath  CARDIOVASCULAR: No chest pain or palpitations  GASTROINTESTINAL: No abdominal pain, or hematochezia.  GENITOURINARY: No dysuria orhematuria  NEUROLOGICAL: No numbness or weakness  SKIN: No itching, burning, rashes  All other review of systems is negative unless indicated above    Vital Signs Last 24 Hrs  T(C): 36.8 (27 Oct 2023 05:09), Max: 37 (26 Oct 2023 12:33)  T(F): 98.3 (27 Oct 2023 05:09), Max: 98.6 (26 Oct 2023 12:33)  HR: 59 (27 Oct 2023 07:57) (58 - 69)  BP: 125/58 (27 Oct 2023 05:09) (120/56 - 125/58)  BP(mean): --  RR: 17 (27 Oct 2023 05:09) (17 - 17)  SpO2: 93% (27 Oct 2023 05:09) (93% - 98%)    Parameters below as of 27 Oct 2023 05:09  Patient On (Oxygen Delivery Method): room air      I&O's Summary    26 Oct 2023 07:01  -  27 Oct 2023 07:00  --------------------------------------------------------  IN: 0 mL / OUT: 800 mL / NET: -800 mL    PHYSICAL EXAM:  Constitutional: NAD  Neurological: Alert, no focal deficits  HEENT: no JVD, EOMI  Cardiovascular: Regular, S1 and S2, no murmur  Pulmonary: breath sounds bilaterally  Gastrointestinal: Bowel Sounds present, soft, nontender  EXT:  no peripheral edema  Skin: No rashes.  Psych:  Mood calm  LABS: All Labs Reviewed:                          7.7    7.51  )-----------( 263      ( 26 Oct 2023 07:30 )             25.6     10-26    139  |  105  |  19  ----------------------------<  189<H>  4.0   |  31  |  0.86    Ca    8.1<L>      26 Oct 2023 07:30    · Assessment    79M with PMH HTN, PPM, DM, foot ulcers , presents to the hospital by ambulance from home.  Son at the bedside dates patient has history of HTN, DM, enlarged prostate, PPM, is bedbound, for the past year has lost 40 to 50 pounds, for the past month not eating or drinking, on Wednesday developed fever, Tmax 101 °F, patient has chronic wounds of his bilateral heels, patient states that he has body pains, burning with urination. Pt does not go to doctor on regular basis per son and does phone visits.  +Bacteremia.        S/p GISSELL: No vegetations    TTE: CONCLUSIONS:    1. Technically difficult image quality.   2. There is abnormal septal activation, likely from a paced rhythm. There is likely superimposed anterior and septal hypokinesis. Limited views and limited endocardial definition make further interpretation limited.   3. The right ventricle is not well visualized. Normal right ventricular cavity size and reduced systolic function.   4. The left atrium is mildly dilated in size.   5. Right atrium was not well visualized.   6. Mild calcification of the aortic valve leaflets.   7. Tricuspid valve was not well visualized.   8. Aortic valve was not well visualized.    1. Multi nonhealing wound  +NM suspicious for OM.  s/p 5th metatarsal amputation POD1  S/p recent debridement with no complication.     pt denied CP    2. HLD continue Statin  Pt has seen Dave Panchal [phone] and Curtis ferris [phone]  will follow prn, call if needed                   1 Hour(s) 5 Minute(s)

## 2024-01-12 LAB — HCG SERPL-MCNC: 2164 MIU/ML

## 2024-01-17 LAB — HCG SERPL-MCNC: 3555 MIU/ML

## 2024-02-16 ENCOUNTER — NON-APPOINTMENT (OUTPATIENT)
Age: 34
End: 2024-02-16

## 2024-02-16 ENCOUNTER — LABORATORY RESULT (OUTPATIENT)
Age: 34
End: 2024-02-16

## 2024-02-16 ENCOUNTER — APPOINTMENT (OUTPATIENT)
Dept: OBGYN | Facility: CLINIC | Age: 34
End: 2024-02-16
Payer: COMMERCIAL

## 2024-02-16 VITALS
HEIGHT: 62 IN | DIASTOLIC BLOOD PRESSURE: 70 MMHG | WEIGHT: 118 LBS | SYSTOLIC BLOOD PRESSURE: 109 MMHG | BODY MASS INDEX: 21.71 KG/M2

## 2024-02-16 DIAGNOSIS — Z11.3 ENCOUNTER FOR SCREENING FOR INFECTIONS WITH A PREDOMINANTLY SEXUAL MODE OF TRANSMISSION: ICD-10-CM

## 2024-02-16 DIAGNOSIS — Z13.29 ENCOUNTER FOR SCREENING FOR OTHER SUSPECTED ENDOCRINE DISORDER: ICD-10-CM

## 2024-02-16 DIAGNOSIS — Z36.9 ENCOUNTER FOR ANTENATAL SCREENING, UNSPECIFIED: ICD-10-CM

## 2024-02-16 DIAGNOSIS — R39.9 UNSPECIFIED SYMPTOMS AND SIGNS INVOLVING THE GENITOURINARY SYSTEM: ICD-10-CM

## 2024-02-16 DIAGNOSIS — R11.0 NAUSEA: ICD-10-CM

## 2024-02-16 DIAGNOSIS — Z12.39 ENCOUNTER FOR OTHER SCREENING FOR MALIGNANT NEOPLASM OF BREAST: ICD-10-CM

## 2024-02-16 DIAGNOSIS — Z13.21 ENCOUNTER FOR SCREENING FOR NUTRITIONAL DISORDER: ICD-10-CM

## 2024-02-16 DIAGNOSIS — Z01.84 ENCOUNTER FOR ANTIBODY RESPONSE EXAMINATION: ICD-10-CM

## 2024-02-16 DIAGNOSIS — Z12.4 ENCOUNTER FOR SCREENING FOR MALIGNANT NEOPLASM OF CERVIX: ICD-10-CM

## 2024-02-16 DIAGNOSIS — N91.1 SECONDARY AMENORRHEA: ICD-10-CM

## 2024-02-16 DIAGNOSIS — Z32.01 ENCOUNTER FOR PREGNANCY TEST, RESULT POSITIVE: ICD-10-CM

## 2024-02-16 DIAGNOSIS — Z13.88 ENCOUNTER FOR SCREENING FOR DISORDER DUE TO EXPOSURE TO CONTAMINANTS: ICD-10-CM

## 2024-02-16 DIAGNOSIS — Z13.0 ENCOUNTER FOR SCREENING FOR DISEASES OF THE BLOOD AND BLOOD-FORMING ORGANS AND CERTAIN DISORDERS INVOLVING THE IMMUNE MECHANISM: ICD-10-CM

## 2024-02-16 PROCEDURE — 99395 PREV VISIT EST AGE 18-39: CPT

## 2024-02-16 PROCEDURE — 99214 OFFICE O/P EST MOD 30 MIN: CPT | Mod: 25

## 2024-02-16 PROCEDURE — 76830 TRANSVAGINAL US NON-OB: CPT

## 2024-02-16 PROCEDURE — 81025 URINE PREGNANCY TEST: CPT

## 2024-02-16 PROCEDURE — 36415 COLL VENOUS BLD VENIPUNCTURE: CPT

## 2024-02-16 NOTE — PROCEDURE
[Cervical Pap Smear] : cervical Pap smear [Liquid Base] : liquid base [GC Chlamydia Culture] : GC Chlamydia Culture [Tolerated Well] : the patient tolerated the procedure well [No Complications] : there were no complications [Transvaginal OB Sonogram] : Transvaginal OB Sonogram [Intrauterine Pregnancy] : intrauterine pregnancy [Yolk Sac] : yolk sac present [Date: ___] : Date: [unfilled] [Current GA by Sonogram: ___ (wks)] : Current GA by Sonogram: [unfilled]Uwks [___ day(s)] : [unfilled] days [Transvaginal OB Sonogram WNL] : Transvaginal OB Sonogram WNL [FreeTextEntry1] : CRL 10w6d, PIERCE 9/7/24

## 2024-02-16 NOTE — PHYSICAL EXAM
[Chaperone Declined] : Patient declined chaperone [Appropriately responsive] : appropriately responsive [Alert] : alert [No Acute Distress] : no acute distress [No Lymphadenopathy] : no lymphadenopathy [Soft] : soft [Non-tender] : non-tender [Non-distended] : non-distended [No HSM] : No HSM [No Lesions] : no lesions [No Mass] : no mass [Oriented x3] : oriented x3 [Examination Of The Breasts] : a normal appearance [No Masses] : no breast masses were palpable [Labia Majora] : normal [Labia Minora] : normal [Normal] : normal [Enlarged ___ wks] : enlarged [unfilled] ~Uweeks [Uterine Adnexae] : normal

## 2024-02-16 NOTE — REVIEW OF SYSTEMS
[Patient Intake Form Reviewed] : Patient intake form was reviewed [Nausea] : nausea [Negative] : Heme/Lymph [Vomiting] : no vomiting [FreeTextEntry8] : amenorrhea

## 2024-02-16 NOTE — COUNSELING
[Nutrition/ Exercise/ Weight Management] : nutrition, exercise, weight management [Vitamins/Supplements] : vitamins/supplements [Drugs/Alcohol] : drugs, alcohol [Breast Self Exam] : breast self exam [Contraception/ Emergency Contraception/ Safe Sexual Practices] : contraception, emergency contraception, safe sexual practices [Preconception Care/ Fertility options] : preconception care, fertility options [Pregnancy Options] : pregnancy options [Confidentiality] : confidentiality [STD (testing, results, tx)] : STD (testing, results, tx) [Influenza Vaccine] : influenza vaccine [Lab Results] : lab results [Medication Management] : medication management

## 2024-02-16 NOTE — PLAN
[FreeTextEntry1] : SECONDARY AMENORRHEA/EARLY IUP -TVUS today shows early IUP measuring  Early pregnancy precautions, practice /hospital info and folder provided to patient.  -NOB blood panel, GCC cx, UCX, pap/hpv collected and sent at todays office visit.  -Cont PNVs  GENETICS -No overlap on prior screening  FT NVD 7/4/2023  Declines flu vaccine  rto 1-2 weeks for NPN/NT/NIPS

## 2024-02-16 NOTE — HISTORY OF PRESENT ILLNESS
[Patient reported PAP Smear was normal] : Patient reported PAP Smear was normal [FreeTextEntry1] : 34 y/o  LMP unknown (prior to last pregnancy) stopped BF 3 months prior;  presents to office for amenorrhea visit and annual exam. Had positive urine home pregnancy tests and bhcg levels last month. c/o nausea, breast tenderness and constipation. Unplanned but desired pregnancy.   x1 "miriam" FT female2023 Colonoscopy 3/2019 Sexually active; no using contraception Menarche age 16, menses every 30-40 days, lasting 5-6 days in duration; pt reports hx of PCOS; longer cycles; had pelvic sono 2019 imaging c/w polycystic ovaries otherwise normal.  Hx of ovarian cysts and endometriosis? per pt hx.  Allergy to Amoxicillin - causes rash.  Pt has IBS, takes Magnesium at night for symptomatic relief.  Employed with [email or web address]; .  [PapSmeardate] : 7/22

## 2024-02-20 LAB
25(OH)D3 SERPL-MCNC: 35.5 NG/ML
ABO + RH PNL BLD: NORMAL
B19V IGG SER QL IA: 6.32 INDEX
B19V IGG+IGM SER-IMP: NORMAL
B19V IGG+IGM SER-IMP: POSITIVE
B19V IGM FLD-ACNC: 0.18 INDEX
B19V IGM SER-ACNC: NEGATIVE
BACTERIA UR CULT: NORMAL
BASOPHILS # BLD AUTO: 0.03 K/UL
BASOPHILS NFR BLD AUTO: 0.3 %
BLD GP AB SCN SERPL QL: NORMAL
C TRACH RRNA SPEC QL NAA+PROBE: NOT DETECTED
EOSINOPHIL # BLD AUTO: 0.07 K/UL
EOSINOPHIL NFR BLD AUTO: 0.7 %
ESTIMATED AVERAGE GLUCOSE: 97 MG/DL
HBA1C MFR BLD HPLC: 5 %
HBV SURFACE AG SER QL: NONREACTIVE
HCT VFR BLD CALC: 44 %
HCV AB SER QL: NONREACTIVE
HCV S/CO RATIO: 0.14 S/CO
HGB A MFR BLD: 97.1 %
HGB A2 MFR BLD: 2.9 %
HGB BLD-MCNC: 14.3 G/DL
HGB FRACT BLD-IMP: NORMAL
HIV1+2 AB SPEC QL IA.RAPID: NONREACTIVE
HPV HIGH+LOW RISK DNA PNL CVX: DETECTED
IMM GRANULOCYTES NFR BLD AUTO: 0.2 %
LEAD BLD-MCNC: <1 UG/DL
LYMPHOCYTES # BLD AUTO: 1.62 K/UL
LYMPHOCYTES NFR BLD AUTO: 17.3 %
MAN DIFF?: NORMAL
MCHC RBC-ENTMCNC: 29.9 PG
MCHC RBC-ENTMCNC: 32.5 GM/DL
MCV RBC AUTO: 91.9 FL
MEV IGG FLD QL IA: 23.1 AU/ML
MEV IGG+IGM SER-IMP: POSITIVE
MONOCYTES # BLD AUTO: 0.71 K/UL
MONOCYTES NFR BLD AUTO: 7.6 %
MUV AB SER-ACNC: POSITIVE
MUV IGG SER QL IA: 31.6 AU/ML
N GONORRHOEA RRNA SPEC QL NAA+PROBE: NOT DETECTED
NEUTROPHILS # BLD AUTO: 6.89 K/UL
NEUTROPHILS NFR BLD AUTO: 73.9 %
PLATELET # BLD AUTO: 251 K/UL
RBC # BLD: 4.79 M/UL
RBC # FLD: 12.8 %
RUBV IGG FLD-ACNC: 2.3 INDEX
RUBV IGG SER-IMP: POSITIVE
SOURCE AMPLIFICATION: NORMAL
T PALLIDUM AB SER QL IA: NEGATIVE
TSH SERPL-ACNC: 2.2 UIU/ML
VZV AB TITR SER: POSITIVE
VZV IGG SER IF-ACNC: 322 INDEX
WBC # FLD AUTO: 9.34 K/UL

## 2024-02-21 ENCOUNTER — NON-APPOINTMENT (OUTPATIENT)
Age: 34
End: 2024-02-21

## 2024-02-21 DIAGNOSIS — Z82.49 FAMILY HISTORY OF ISCHEMIC HEART DISEASE AND OTHER DISEASES OF THE CIRCULATORY SYSTEM: ICD-10-CM

## 2024-02-21 DIAGNOSIS — Z80.2 FAMILY HISTORY OF MALIGNANT NEOPLASM OF OTHER RESPIRATORY AND INTRATHORACIC ORGANS: ICD-10-CM

## 2024-02-27 ENCOUNTER — NON-APPOINTMENT (OUTPATIENT)
Age: 34
End: 2024-02-27

## 2024-02-27 ENCOUNTER — ASOB RESULT (OUTPATIENT)
Age: 34
End: 2024-02-27

## 2024-02-27 ENCOUNTER — APPOINTMENT (OUTPATIENT)
Dept: OBGYN | Facility: CLINIC | Age: 34
End: 2024-02-27
Payer: COMMERCIAL

## 2024-02-27 PROCEDURE — 76801 OB US < 14 WKS SINGLE FETUS: CPT

## 2024-02-27 PROCEDURE — 76813 OB US NUCHAL MEAS 1 GEST: CPT

## 2024-02-27 PROCEDURE — 0500F INITIAL PRENATAL CARE VISIT: CPT

## 2024-02-27 PROCEDURE — 36415 COLL VENOUS BLD VENIPUNCTURE: CPT

## 2024-03-04 ENCOUNTER — TRANSCRIPTION ENCOUNTER (OUTPATIENT)
Age: 34
End: 2024-03-04

## 2024-03-08 ENCOUNTER — TRANSCRIPTION ENCOUNTER (OUTPATIENT)
Age: 34
End: 2024-03-08

## 2024-04-02 ENCOUNTER — APPOINTMENT (OUTPATIENT)
Dept: OBGYN | Facility: CLINIC | Age: 34
End: 2024-04-02
Payer: COMMERCIAL

## 2024-04-02 VITALS
HEIGHT: 62 IN | WEIGHT: 123 LBS | SYSTOLIC BLOOD PRESSURE: 93 MMHG | BODY MASS INDEX: 22.63 KG/M2 | DIASTOLIC BLOOD PRESSURE: 66 MMHG

## 2024-04-02 DIAGNOSIS — B97.7 PAPILLOMAVIRUS AS THE CAUSE OF DISEASES CLASSIFIED ELSEWHERE: ICD-10-CM

## 2024-04-02 PROCEDURE — 57454 BX/CURETT OF CERVIX W/SCOPE: CPT

## 2024-04-02 NOTE — PROCEDURE
[Colposcopy] : Colposcopy  [Consent Obtained] : Consent obtained [Time out performed] : Pre-procedure time out performed.  Patient's name, date of birth and procedure confirmed. [Risks] : risks [Alternatives] : alternatives [Benefits] : benefits [Infection] : infection [Patient] : patient [Bleeding] : bleeding [Allergic Reaction] : allergic reaction [Hemostasis Obtained] : Hemostasis obtained [Tolerated Well] : the patient tolerated the procedure well [ASCUS] : ASCUS [Colposcopy Adequate] : colposcopy adequate [No Abnormalities] : no abnormalities [de-identified] : pt did well no biopsies performed since pregnant plan for PP follow up

## 2024-04-02 NOTE — END OF VISIT
[FreeTextEntry3] : I, Zarina Frost, acted as a scribe on behalf of Dr. Ankita Yates on 04/02/2024 .  All medical entries made by the scribe were at my, Dr. Ankita Yates, direction and personally dictated by me on 04/02/2024. I have reviewed the chart and agree that the record accurately reflects my personal performance of the history, physical exam, assessment and plan. I have also personally directed, reviewed, and agreed with the chart.

## 2024-04-05 ENCOUNTER — TRANSCRIPTION ENCOUNTER (OUTPATIENT)
Age: 34
End: 2024-04-05

## 2024-04-05 LAB
AFP MOM: 0.84
AFP VALUE: 39 NG/ML
ALPHA FETOPROTEIN SERUM COMMENT: NORMAL
ALPHA FETOPROTEIN SERUM INTERPRETATION: NORMAL
ALPHA FETOPROTEIN SERUM RESULTS: NORMAL
ALPHA FETOPROTEIN SERUM TEST RESULTS: NORMAL
GESTATIONAL AGE BASED ON: NORMAL
GESTATIONAL AGE ON COLLECTION DATE: 17.4 WEEKS
INSULIN DEP DIABETES: NO
MATERNAL AGE AT EDD AFP: 32.7 YR
MULTIPLE GESTATION: NO
OSBR RISK 1 IN: NORMAL
RACE: NORMAL
WEIGHT AFP: 123 LBS

## 2024-04-23 ENCOUNTER — APPOINTMENT (OUTPATIENT)
Dept: ANTEPARTUM | Facility: CLINIC | Age: 34
End: 2024-04-23
Payer: COMMERCIAL

## 2024-04-23 ENCOUNTER — ASOB RESULT (OUTPATIENT)
Age: 34
End: 2024-04-23

## 2024-04-23 PROCEDURE — 76805 OB US >/= 14 WKS SNGL FETUS: CPT

## 2024-04-29 ENCOUNTER — NON-APPOINTMENT (OUTPATIENT)
Age: 34
End: 2024-04-29

## 2024-05-01 ENCOUNTER — APPOINTMENT (OUTPATIENT)
Dept: OBGYN | Facility: CLINIC | Age: 34
End: 2024-05-01
Payer: COMMERCIAL

## 2024-05-01 PROCEDURE — 0502F SUBSEQUENT PRENATAL CARE: CPT

## 2024-05-01 NOTE — OB RN PATIENT PROFILE - FUNCTIONAL ASSESSMENT - DAILY ACTIVITY 6.
CC: Obstetric visit    HPI: 22-year-old  1 para 0 presents at 33-6/7 weeks for her obstetric visit.  She has been having difficulty with her glycemic monitoring equipment.  She has been working with Delia Guzman to get this resolved.  She reports that her fastings are consistently under 95.  2-hour postprandials, however, remains slightly elevated.  Also, the patient complains of debilitating low back pain and pelvic pressure which occur while she is at work.  These are relieved while she is at home.    Physical examination    The abdomen is soft and gravid.  There is no epigastric tenderness.  No fundal tenderness.  No CVA tenderness.  No suprapubic tenderness.  Extremities are equal in size    Assessment and plan    1.  Intrauterine pregnancy at 33-6/7 weeks  2.  Gestational diabetes, A2.  The patient's sugars remain slightly elevated and her postprandial values.  Her fastings are improved.  The patient plans to send her sugars to Delia who will assess for possible adjustment of her insulin.  Also, her monitoring equipment has been giving her troubles.  She is working on getting this resolved.  3.  Pelvic pressure and low back pain which interfere with the patient's ability to perform her activities required for work.  She feels that she is not able to complete these activities any longer.  For this reason, we will start decreased activities at home.  
4 = No assist / stand by assistance

## 2024-06-06 ENCOUNTER — APPOINTMENT (OUTPATIENT)
Dept: OBGYN | Facility: CLINIC | Age: 34
End: 2024-06-06
Payer: COMMERCIAL

## 2024-06-06 DIAGNOSIS — Z34.92 ENCOUNTER FOR SUPERVISION OF NORMAL PREGNANCY, UNSPECIFIED, SECOND TRIMESTER: ICD-10-CM

## 2024-06-06 PROCEDURE — 0502F SUBSEQUENT PRENATAL CARE: CPT

## 2024-06-07 LAB
25(OH)D3 SERPL-MCNC: 45.4 NG/ML
BASOPHILS # BLD AUTO: 0.02 K/UL
BASOPHILS NFR BLD AUTO: 0.2 %
BLD GP AB SCN SERPL QL: NORMAL
EOSINOPHIL # BLD AUTO: 0.04 K/UL
EOSINOPHIL NFR BLD AUTO: 0.5 %
GLUCOSE 1H P 50 G GLC PO SERPL-MCNC: 120 MG/DL
HCT VFR BLD CALC: 35.5 %
HGB BLD-MCNC: 11.7 G/DL
HIV1+2 AB SPEC QL IA.RAPID: NONREACTIVE
IMM GRANULOCYTES NFR BLD AUTO: 0.2 %
LYMPHOCYTES # BLD AUTO: 1.37 K/UL
LYMPHOCYTES NFR BLD AUTO: 16.9 %
MAN DIFF?: NORMAL
MCHC RBC-ENTMCNC: 32.1 PG
MCHC RBC-ENTMCNC: 33 GM/DL
MCV RBC AUTO: 97.3 FL
MONOCYTES # BLD AUTO: 0.48 K/UL
MONOCYTES NFR BLD AUTO: 5.9 %
NEUTROPHILS # BLD AUTO: 6.16 K/UL
NEUTROPHILS NFR BLD AUTO: 76.3 %
PLATELET # BLD AUTO: 150 K/UL
RBC # BLD: 3.65 M/UL
RBC # FLD: 13.7 %
T PALLIDUM AB SER QL IA: NEGATIVE
WBC # FLD AUTO: 8.09 K/UL

## 2024-06-18 ENCOUNTER — NON-APPOINTMENT (OUTPATIENT)
Age: 34
End: 2024-06-18

## 2024-06-18 ENCOUNTER — OUTPATIENT (OUTPATIENT)
Dept: OUTPATIENT SERVICES | Facility: HOSPITAL | Age: 34
LOS: 1 days | End: 2024-06-18
Payer: COMMERCIAL

## 2024-06-18 VITALS — TEMPERATURE: 98 F | SYSTOLIC BLOOD PRESSURE: 105 MMHG | DIASTOLIC BLOOD PRESSURE: 59 MMHG | HEART RATE: 78 BPM

## 2024-06-18 VITALS — DIASTOLIC BLOOD PRESSURE: 55 MMHG | SYSTOLIC BLOOD PRESSURE: 102 MMHG | HEART RATE: 74 BPM | TEMPERATURE: 98 F

## 2024-06-18 DIAGNOSIS — O26.899 OTHER SPECIFIED PREGNANCY RELATED CONDITIONS, UNSPECIFIED TRIMESTER: ICD-10-CM

## 2024-06-18 DIAGNOSIS — Z90.89 ACQUIRED ABSENCE OF OTHER ORGANS: Chronic | ICD-10-CM

## 2024-06-18 PROCEDURE — 59025 FETAL NON-STRESS TEST: CPT

## 2024-06-18 PROCEDURE — G0463: CPT

## 2024-06-20 DIAGNOSIS — O26.853 SPOTTING COMPLICATING PREGNANCY, THIRD TRIMESTER: ICD-10-CM

## 2024-06-20 DIAGNOSIS — Z3A.28 28 WEEKS GESTATION OF PREGNANCY: ICD-10-CM

## 2024-07-01 ENCOUNTER — APPOINTMENT (OUTPATIENT)
Dept: OBGYN | Facility: CLINIC | Age: 34
End: 2024-07-01
Payer: COMMERCIAL

## 2024-07-01 PROCEDURE — 90471 IMMUNIZATION ADMIN: CPT

## 2024-07-01 PROCEDURE — 0502F SUBSEQUENT PRENATAL CARE: CPT

## 2024-07-01 PROCEDURE — 90715 TDAP VACCINE 7 YRS/> IM: CPT

## 2024-07-15 ENCOUNTER — ASOB RESULT (OUTPATIENT)
Age: 34
End: 2024-07-15

## 2024-07-15 ENCOUNTER — APPOINTMENT (OUTPATIENT)
Dept: OBGYN | Facility: CLINIC | Age: 34
End: 2024-07-15
Payer: COMMERCIAL

## 2024-07-15 DIAGNOSIS — Z34.03 ENCOUNTER FOR SUPERVISION OF NORMAL FIRST PREGNANCY, THIRD TRIMESTER: ICD-10-CM

## 2024-07-15 PROCEDURE — 76819 FETAL BIOPHYS PROFIL W/O NST: CPT | Mod: 59

## 2024-07-15 PROCEDURE — 76816 OB US FOLLOW-UP PER FETUS: CPT

## 2024-07-15 PROCEDURE — 0502F SUBSEQUENT PRENATAL CARE: CPT

## 2024-07-30 ENCOUNTER — APPOINTMENT (OUTPATIENT)
Dept: OBGYN | Facility: CLINIC | Age: 34
End: 2024-07-30
Payer: COMMERCIAL

## 2024-07-30 PROCEDURE — 0502F SUBSEQUENT PRENATAL CARE: CPT

## 2024-08-05 ENCOUNTER — NON-APPOINTMENT (OUTPATIENT)
Age: 34
End: 2024-08-05

## 2024-08-05 ENCOUNTER — OUTPATIENT (OUTPATIENT)
Dept: OUTPATIENT SERVICES | Facility: HOSPITAL | Age: 34
LOS: 1 days | End: 2024-08-05
Payer: COMMERCIAL

## 2024-08-05 VITALS
SYSTOLIC BLOOD PRESSURE: 106 MMHG | HEART RATE: 78 BPM | RESPIRATION RATE: 15 BRPM | DIASTOLIC BLOOD PRESSURE: 66 MMHG | TEMPERATURE: 98 F

## 2024-08-05 VITALS — HEART RATE: 81 BPM | OXYGEN SATURATION: 97 %

## 2024-08-05 DIAGNOSIS — Z90.89 ACQUIRED ABSENCE OF OTHER ORGANS: Chronic | ICD-10-CM

## 2024-08-05 DIAGNOSIS — O26.899 OTHER SPECIFIED PREGNANCY RELATED CONDITIONS, UNSPECIFIED TRIMESTER: ICD-10-CM

## 2024-08-05 PROCEDURE — 83986 ASSAY PH BODY FLUID NOS: CPT

## 2024-08-05 PROCEDURE — 87653 STREP B DNA AMP PROBE: CPT

## 2024-08-05 PROCEDURE — G0463: CPT

## 2024-08-05 PROCEDURE — 59025 FETAL NON-STRESS TEST: CPT

## 2024-08-05 NOTE — OB PROVIDER TRIAGE NOTE - NSOBPROVIDERNOTE_OBGYN_ALL_OB_FT
33y  at 35+2 presenting for r/o PPROM, no evidence of ROM on exam, AZAEL wnl.     - On speculum exam, normal discharge and os visually closed   - BSUS adequate fluid (MVP 7.5)   - GBS swab sent  - NST reactive & reassuring  - Discharge to home with return precautions    D/w Dr. Tiffanie Banegas PGY4

## 2024-08-05 NOTE — OB PROVIDER TRIAGE NOTE - NSHPPHYSICALEXAM_GEN_ALL_CORE
ICU Vital Signs Last 24 Hrs  T(C): 36.6 (05 Aug 2024 16:16), Max: 36.9 (05 Aug 2024 15:34)  T(F): 97.88 (05 Aug 2024 16:16), Max: 98.42 (05 Aug 2024 15:34)  HR: 78 (05 Aug 2024 16:16) (73 - 87)  BP: 106/66 (05 Aug 2024 16:16) (106/66 - 107/60)  BP(mean): --  ABP: --  ABP(mean): --  RR: 15 (05 Aug 2024 16:16) (15 - 15)  SpO2: 96% (05 Aug 2024 16:08) (93% - 98%)    O2 Parameters below as of 05 Aug 2024 15:35  Patient On (Oxygen Delivery Method): room air        Gen: NAD  Abd: Soft, nontender  Ext: No edema/erythema  SSE: -pooling/-nitrazine

## 2024-08-05 NOTE — OB PROVIDER TRIAGE NOTE - HISTORY OF PRESENT ILLNESS
34yo  @35w2d  p/f intermittent leakage of fluid for the last several days. Most recently had LOF today at 12pm. No CTX/VB. Good FM.      PNC: unc  GBS: unknown    ObHx:  FT  6#, uncomplicated  GynHx: denies  MedHx: denies  SrgHx: Tonsillectomy  PsychHx: denies  SocialHx: denies  AllergyHx: Amoxicillin  RxHx: denies

## 2024-08-05 NOTE — OB PROVIDER TRIAGE NOTE - NSICDXPASTMEDICALHX_GEN_ALL_CORE_FT
PAST MEDICAL HISTORY:  History of COVID-19     IBS (irritable bowel syndrome)     Transverse fetal lie      flat affect flat affect

## 2024-08-07 DIAGNOSIS — Z86.16 PERSONAL HISTORY OF COVID-19: ICD-10-CM

## 2024-08-07 DIAGNOSIS — Z3A.35 35 WEEKS GESTATION OF PREGNANCY: ICD-10-CM

## 2024-08-07 DIAGNOSIS — Z03.71 ENCOUNTER FOR SUSPECTED PROBLEM WITH AMNIOTIC CAVITY AND MEMBRANE RULED OUT: ICD-10-CM

## 2024-08-07 LAB
GROUP B BETA STREP DNA (PCR): SIGNIFICANT CHANGE UP
SOURCE GROUP B STREP: SIGNIFICANT CHANGE UP

## 2024-08-14 ENCOUNTER — ASOB RESULT (OUTPATIENT)
Age: 34
End: 2024-08-14

## 2024-08-14 ENCOUNTER — APPOINTMENT (OUTPATIENT)
Dept: OBGYN | Facility: CLINIC | Age: 34
End: 2024-08-14
Payer: COMMERCIAL

## 2024-08-14 PROCEDURE — 0502F SUBSEQUENT PRENATAL CARE: CPT

## 2024-08-14 PROCEDURE — 76819 FETAL BIOPHYS PROFIL W/O NST: CPT | Mod: 59

## 2024-08-14 PROCEDURE — 76816 OB US FOLLOW-UP PER FETUS: CPT

## 2024-08-15 ENCOUNTER — NON-APPOINTMENT (OUTPATIENT)
Age: 34
End: 2024-08-15

## 2024-08-19 ENCOUNTER — NON-APPOINTMENT (OUTPATIENT)
Age: 34
End: 2024-08-19

## 2024-08-19 ENCOUNTER — APPOINTMENT (OUTPATIENT)
Dept: OBGYN | Facility: CLINIC | Age: 34
End: 2024-08-19
Payer: COMMERCIAL

## 2024-08-19 PROCEDURE — 0502F SUBSEQUENT PRENATAL CARE: CPT

## 2024-08-28 ENCOUNTER — APPOINTMENT (OUTPATIENT)
Dept: OBGYN | Facility: CLINIC | Age: 34
End: 2024-08-28
Payer: COMMERCIAL

## 2024-08-28 PROCEDURE — 0502F SUBSEQUENT PRENATAL CARE: CPT

## 2024-09-02 ENCOUNTER — INPATIENT (INPATIENT)
Facility: HOSPITAL | Age: 34
LOS: 1 days | Discharge: HOME | DRG: 951 | End: 2024-09-04
Attending: STUDENT IN AN ORGANIZED HEALTH CARE EDUCATION/TRAINING PROGRAM | Admitting: STUDENT IN AN ORGANIZED HEALTH CARE EDUCATION/TRAINING PROGRAM
Payer: COMMERCIAL

## 2024-09-02 VITALS — OXYGEN SATURATION: 100 % | HEART RATE: 70 BPM

## 2024-09-02 DIAGNOSIS — Z90.89 ACQUIRED ABSENCE OF OTHER ORGANS: Chronic | ICD-10-CM

## 2024-09-02 DIAGNOSIS — O26.899 OTHER SPECIFIED PREGNANCY RELATED CONDITIONS, UNSPECIFIED TRIMESTER: ICD-10-CM

## 2024-09-02 DIAGNOSIS — Z34.80 ENCOUNTER FOR SUPERVISION OF OTHER NORMAL PREGNANCY, UNSPECIFIED TRIMESTER: ICD-10-CM

## 2024-09-02 LAB
BASOPHILS # BLD AUTO: 0.03 K/UL — SIGNIFICANT CHANGE UP (ref 0–0.2)
BASOPHILS NFR BLD AUTO: 0.3 % — SIGNIFICANT CHANGE UP (ref 0–2)
EOSINOPHIL # BLD AUTO: 0.03 K/UL — SIGNIFICANT CHANGE UP (ref 0–0.5)
EOSINOPHIL NFR BLD AUTO: 0.3 % — SIGNIFICANT CHANGE UP (ref 0–6)
HCT VFR BLD CALC: 37.2 % — SIGNIFICANT CHANGE UP (ref 34.5–45)
HGB BLD-MCNC: 12.8 G/DL — SIGNIFICANT CHANGE UP (ref 11.5–15.5)
IMM GRANULOCYTES NFR BLD AUTO: 0.5 % — SIGNIFICANT CHANGE UP (ref 0–0.9)
LYMPHOCYTES # BLD AUTO: 19.4 % — SIGNIFICANT CHANGE UP (ref 13–44)
LYMPHOCYTES # BLD AUTO: 2.1 K/UL — SIGNIFICANT CHANGE UP (ref 1–3.3)
MCHC RBC-ENTMCNC: 31.1 PG — SIGNIFICANT CHANGE UP (ref 27–34)
MCHC RBC-ENTMCNC: 34.4 GM/DL — SIGNIFICANT CHANGE UP (ref 32–36)
MCV RBC AUTO: 90.5 FL — SIGNIFICANT CHANGE UP (ref 80–100)
MONOCYTES # BLD AUTO: 0.76 K/UL — SIGNIFICANT CHANGE UP (ref 0–0.9)
MONOCYTES NFR BLD AUTO: 7 % — SIGNIFICANT CHANGE UP (ref 2–14)
NEUTROPHILS # BLD AUTO: 7.86 K/UL — HIGH (ref 1.8–7.4)
NEUTROPHILS NFR BLD AUTO: 72.5 % — SIGNIFICANT CHANGE UP (ref 43–77)
NRBC # BLD: 0 /100 WBCS — SIGNIFICANT CHANGE UP (ref 0–0)
PLATELET # BLD AUTO: 124 K/UL — LOW (ref 150–400)
RBC # BLD: 4.11 M/UL — SIGNIFICANT CHANGE UP (ref 3.8–5.2)
RBC # FLD: 12.2 % — SIGNIFICANT CHANGE UP (ref 10.3–14.5)
WBC # BLD: 10.83 K/UL — HIGH (ref 3.8–10.5)
WBC # FLD AUTO: 10.83 K/UL — HIGH (ref 3.8–10.5)

## 2024-09-02 RX ORDER — CHLORHEXIDINE GLUCONATE 40 MG/ML
1 SOLUTION TOPICAL DAILY
Refills: 0 | Status: DISCONTINUED | OUTPATIENT
Start: 2024-09-02 | End: 2024-09-03

## 2024-09-02 RX ORDER — OXYTOCIN 10 UNIT/ML
333.33 AMPUL (ML) INJECTION
Qty: 20 | Refills: 0 | Status: DISCONTINUED | OUTPATIENT
Start: 2024-09-02 | End: 2024-09-04

## 2024-09-02 RX ORDER — SODIUM CITRATE AND CITRIC ACID MONOHYDRATE 334; 500 MG/5ML; MG/5ML
15 SOLUTION ORAL EVERY 6 HOURS
Refills: 0 | Status: DISCONTINUED | OUTPATIENT
Start: 2024-09-02 | End: 2024-09-03

## 2024-09-02 NOTE — OB RN PATIENT PROFILE - EDUCATION ON THE POTENTIAL RISKS AND IMPACT OF EARLY USE OF PACIFIERS ON THE ESTABLISHMENT OF BREASTFEEDING
Called and left detailed voicemail regarding hemoglobin and low WBC. Needs neupogen x 3 days asap. If patient having difficulties with ADLs, transfuse blood.  
Statement Selected

## 2024-09-02 NOTE — OB PROVIDER H&P - ATTENDING COMMENTS
I saw and evaluated Ms. Magana and agree with above.   Consent form for management of labor and delivery, vaginal delivery, possible vacuum and possible  reviewed, signed, and placed on chart.   All questions were answered.   Claudio GARDINER

## 2024-09-02 NOTE — OB PROVIDER H&P - ASSESSMENT
34yo F  @39+2 in early labor    Plan  - Admit to L&D. Routine Labs. IVF.  - Augmentation with AROM  - Fetus: cat 1 tracing. VTX. EFW extrapolated to 3236g by sono. Continuous EFM. No concerns.  - Prenatal issues: none  - GBS neg, ampicillin not indicated  - Pain: epidural PRN    Patient discussed with attending physician, Dr. Bansal.    DONNIE Hernandez, PGY4

## 2024-09-02 NOTE — OB PROVIDER H&P - HISTORY OF PRESENT ILLNESS
HPI: 32yo F  @39+2 presents for painful contractions since 2pm. +FM. -LOF. -VB. Pt denies any other concerns.  Patient had a fast labor with her prior pregnancy    PNC: Denies prenatal issues.   GBS negative  EFW 2636g by sono 3 weeks ago.    OBHx:  FT  6#, uncomplicated  GynHx: denies hx STIs, fibroids, polyps, cysts  PMH: denies hx clotting or bleeding disorders, HTN, DM  PSH: tonsillectomy  PFH: no hx congenital disorders, bleeding/clotting disorders  Psych: denies   Social: denies etoh, smoking, drugs. Safe at home/in relationship.  Meds: PNV   Allergies: PCN (rash)  Will accept blood transfusions? Yes

## 2024-09-02 NOTE — OB RN PATIENT PROFILE - PAIN SCALE PREFERRED, PROFILE
Per letter has to fail   metfromin or metfromin ER  She has failed metfromin plain   Please appeal if can  Or if trajenta 5mg covered daily with no pa   Can try this #30 no refill numerical 0-10

## 2024-09-02 NOTE — OB PROVIDER H&P - NSHPPHYSICALEXAM_GEN_ALL_CORE
T(C): 36.7 (09-02-24 @ 22:34), Max: 36.7 (09-02-24 @ 21:47)  HR: 72 (09-02-24 @ 22:38) (61 - 77)  BP: 121/76 (09-02-24 @ 22:34) (121/76 - 121/76)  RR: 18 (09-02-24 @ 22:34) (18 - 18)  SpO2: 97% (09-02-24 @ 22:38) (96% - 100%)    Gen: NAD  CV: RRR  Pulm: breathing comfortably on RA  Abd: gravid, nontender  Extr: moving all extremities with ease  – VE: 3/80/-2, intact  – FHT Cat I: baseline 160, mod variability, +accels, -decels  – [place]: q4 min  – Sono: vertex, anterior placenta

## 2024-09-03 LAB
BLD GP AB SCN SERPL QL: NEGATIVE — SIGNIFICANT CHANGE UP
RH IG SCN BLD-IMP: POSITIVE — SIGNIFICANT CHANGE UP
T PALLIDUM AB TITR SER: NEGATIVE — SIGNIFICANT CHANGE UP

## 2024-09-03 PROCEDURE — 59400 OBSTETRICAL CARE: CPT

## 2024-09-03 RX ORDER — SODIUM CHLORIDE 9 MG/ML
3 INJECTION INTRAMUSCULAR; INTRAVENOUS; SUBCUTANEOUS EVERY 8 HOURS
Refills: 0 | Status: DISCONTINUED | OUTPATIENT
Start: 2024-09-03 | End: 2024-09-04

## 2024-09-03 RX ORDER — WITCH HAZEL 1 G/ML
1 LIQUID TOPICAL EVERY 4 HOURS
Refills: 0 | Status: DISCONTINUED | OUTPATIENT
Start: 2024-09-03 | End: 2024-09-04

## 2024-09-03 RX ORDER — KETOROLAC TROMETHAMINE 30 MG/ML
30 INJECTION, SOLUTION INTRAMUSCULAR ONCE
Refills: 0 | Status: DISCONTINUED | OUTPATIENT
Start: 2024-09-03 | End: 2024-09-03

## 2024-09-03 RX ORDER — TETANUS TOXOID, REDUCED DIPHTHERIA TOXOID AND ACELLULAR PERTUSSIS VACCINE, ADSORBED 5; 2.5; 8; 8; 2.5 [IU]/.5ML; [IU]/.5ML; UG/.5ML; UG/.5ML; UG/.5ML
0.5 SUSPENSION INTRAMUSCULAR ONCE
Refills: 0 | Status: DISCONTINUED | OUTPATIENT
Start: 2024-09-03 | End: 2024-09-04

## 2024-09-03 RX ORDER — IBUPROFEN 600 MG
600 TABLET ORAL EVERY 6 HOURS
Refills: 0 | Status: COMPLETED | OUTPATIENT
Start: 2024-09-03 | End: 2025-08-02

## 2024-09-03 RX ORDER — VITAMIN A, ASCORBIC ACID, VITAMIN D, .ALPHA.-TOCOPHEROL, THIAMINE MONONITRATE, RIBOFLAVIN, NIACIN, PYRIDOXINE HYDROCHLORIDE, FOLIC ACID, CYANOCOBALAMIN, CALCIUM, IRON, MAGNESIUM, ZINC, AND COPPER 2700; 70; 400; 30; 1.6; 1.8; 18; 2.5; 1; 12; 100; 65; 25; 25; 2 [IU]/1; MG/1; [IU]/1; [IU]/1; MG/1; MG/1; MG/1; MG/1; MG/1; UG/1; MG/1; MG/1; MG/1; MG/1; MG/1
1 TABLET ORAL DAILY
Refills: 0 | Status: DISCONTINUED | OUTPATIENT
Start: 2024-09-03 | End: 2024-09-04

## 2024-09-03 RX ORDER — HYDROCORTISONE 1 %
1 OINTMENT (GRAM) TOPICAL EVERY 6 HOURS
Refills: 0 | Status: DISCONTINUED | OUTPATIENT
Start: 2024-09-03 | End: 2024-09-04

## 2024-09-03 RX ORDER — DIPHENHYDRAMINE HCL 50 MG
25 CAPSULE ORAL EVERY 6 HOURS
Refills: 0 | Status: DISCONTINUED | OUTPATIENT
Start: 2024-09-03 | End: 2024-09-04

## 2024-09-03 RX ORDER — ACETAMINOPHEN 325 MG/1
975 TABLET ORAL
Refills: 0 | Status: DISCONTINUED | OUTPATIENT
Start: 2024-09-03 | End: 2024-09-04

## 2024-09-03 RX ORDER — PRAMOXINE HCL 1 %
1 GEL (GRAM) TOPICAL EVERY 4 HOURS
Refills: 0 | Status: DISCONTINUED | OUTPATIENT
Start: 2024-09-03 | End: 2024-09-04

## 2024-09-03 RX ORDER — MENTHOL/CETYLPYRD CL 3 MG
1 LOZENGE MUCOUS MEMBRANE EVERY 6 HOURS
Refills: 0 | Status: DISCONTINUED | OUTPATIENT
Start: 2024-09-03 | End: 2024-09-04

## 2024-09-03 RX ORDER — IBUPROFEN 600 MG
600 TABLET ORAL EVERY 6 HOURS
Refills: 0 | Status: DISCONTINUED | OUTPATIENT
Start: 2024-09-03 | End: 2024-09-04

## 2024-09-03 RX ORDER — OXYTOCIN 10 UNIT/ML
41.67 AMPUL (ML) INJECTION
Qty: 20 | Refills: 0 | Status: DISCONTINUED | OUTPATIENT
Start: 2024-09-03 | End: 2024-09-04

## 2024-09-03 RX ORDER — OXYCODONE HYDROCHLORIDE 5 MG/1
5 TABLET ORAL ONCE
Refills: 0 | Status: DISCONTINUED | OUTPATIENT
Start: 2024-09-03 | End: 2024-09-04

## 2024-09-03 RX ORDER — LANOLIN
1 OINTMENT (GRAM) TOPICAL EVERY 6 HOURS
Refills: 0 | Status: DISCONTINUED | OUTPATIENT
Start: 2024-09-03 | End: 2024-09-04

## 2024-09-03 RX ORDER — OXYCODONE HYDROCHLORIDE 5 MG/1
5 TABLET ORAL
Refills: 0 | Status: DISCONTINUED | OUTPATIENT
Start: 2024-09-03 | End: 2024-09-04

## 2024-09-03 RX ADMIN — Medication 600 MILLIGRAM(S): at 23:17

## 2024-09-03 RX ADMIN — Medication 600 MILLIGRAM(S): at 11:58

## 2024-09-03 RX ADMIN — ACETAMINOPHEN 975 MILLIGRAM(S): 325 TABLET ORAL at 14:45

## 2024-09-03 RX ADMIN — ACETAMINOPHEN 975 MILLIGRAM(S): 325 TABLET ORAL at 08:47

## 2024-09-03 RX ADMIN — ACETAMINOPHEN 975 MILLIGRAM(S): 325 TABLET ORAL at 22:00

## 2024-09-03 RX ADMIN — Medication 600 MILLIGRAM(S): at 18:39

## 2024-09-03 RX ADMIN — ACETAMINOPHEN 975 MILLIGRAM(S): 325 TABLET ORAL at 15:20

## 2024-09-03 RX ADMIN — Medication 600 MILLIGRAM(S): at 12:30

## 2024-09-03 RX ADMIN — VITAMIN A, ASCORBIC ACID, VITAMIN D, .ALPHA.-TOCOPHEROL, THIAMINE MONONITRATE, RIBOFLAVIN, NIACIN, PYRIDOXINE HYDROCHLORIDE, FOLIC ACID, CYANOCOBALAMIN, CALCIUM, IRON, MAGNESIUM, ZINC, AND COPPER 1 TABLET(S): 2700; 70; 400; 30; 1.6; 1.8; 18; 2.5; 1; 12; 100; 65; 25; 25; 2 TABLET ORAL at 11:57

## 2024-09-03 RX ADMIN — ACETAMINOPHEN 975 MILLIGRAM(S): 325 TABLET ORAL at 21:15

## 2024-09-03 RX ADMIN — Medication 600 MILLIGRAM(S): at 19:10

## 2024-09-03 RX ADMIN — KETOROLAC TROMETHAMINE 30 MILLIGRAM(S): 30 INJECTION, SOLUTION INTRAMUSCULAR at 02:28

## 2024-09-03 RX ADMIN — ACETAMINOPHEN 975 MILLIGRAM(S): 325 TABLET ORAL at 09:20

## 2024-09-03 NOTE — OB PROVIDER DELIVERY SUMMARY - NSSELHIDDEN_OBGYN_ALL_OB_FT
[NS_DeliveryAttending1_OBGYN_ALL_OB_FT:[id number]==],[NS_DeliveryAssist1_OBGYN_ALL_OB_FT:[id number]=]

## 2024-09-03 NOTE — OB PROVIDER LABOR PROGRESS NOTE - NS_SUBJECTIVE/OBJECTIVE_OBGYN_ALL_OB_FT
S:  Pt seen and examined for AROM    O:  Vital Signs Last 24 Hrs  T(C): 36.7 (02 Sep 2024 22:34), Max: 36.7 (02 Sep 2024 21:47)  T(F): 98.1 (02 Sep 2024 22:34), Max: 98.1 (02 Sep 2024 21:47)  HR: 71 (03 Sep 2024 01:04) (61 - 81)  BP: 99/62 (03 Sep 2024 00:56) (85/51 - 122/71)  BP(mean): --  RR: 18 (02 Sep 2024 22:34) (18 - 18)  SpO2: 99% (03 Sep 2024 01:04) (96% - 100%)    Parameters below as of 02 Sep 2024 22:34  Patient On (Oxygen Delivery Method): room air

## 2024-09-03 NOTE — OB PROVIDER LABOR PROGRESS NOTE - ASSESSMENT
Pt is a 34yo  admitted for labor    - AROM performed at . Patient tolerated, no complications  - Continue cont EFM, toco, IVF  - Epidural in place  - GBS negative  - Anticipate     Rachel Holloway, PGY-1

## 2024-09-03 NOTE — OB PROVIDER DELIVERY SUMMARY - NSPROVIDERDELIVERYNOTE_OBGYN_ALL_OB_FT
Patient fully dilated and pushing.   of liveborn male infant.  Head, shoulders and body delivered easily in OA position.  Delayed cord clamping 60s.  Cord clamped and cut, infant to mother.  Placenta delivered intact.  Vaginal exam revealed intact cervix, vaginal walls, sulci.  Periurethral laceration identified and repaired in usual fashion with 2.0 vicryl suture.  Fundal massage provided with minimal clot evacuated. Fundus and lower uterine segment firm. Excellent hemostasis. Count was correct x2.     Rachel Holloway, PGY-1  With Dr. Bansal, attending

## 2024-09-03 NOTE — OB RN DELIVERY SUMMARY - NS_SEPSISRSKCALC_OBGYN_ALL_OB_FT
EOS calculated successfully. EOS Risk Factor: 0.5/1000 live births ([facility] national incidence); GA=39w3d; Temp=98.24; ROM=0.517; GBS='Negative'; Antibiotics='No antibiotics or any antibiotics < 2 hrs prior to birth'

## 2024-09-03 NOTE — OB RN DELIVERY SUMMARY - NSSELHIDDEN_OBGYN_ALL_OB_FT
[NS_DeliveryAttending1_OBGYN_ALL_OB_FT:[id number]==],[NS_DeliveryAssist1_OBGYN_ALL_OB_FT:[id number]],[NS_DeliveryRN_OBGYN_ALL_OB_FT:[id number]]

## 2024-09-04 ENCOUNTER — TRANSCRIPTION ENCOUNTER (OUTPATIENT)
Age: 34
End: 2024-09-04

## 2024-09-04 ENCOUNTER — APPOINTMENT (OUTPATIENT)
Dept: OBGYN | Facility: CLINIC | Age: 34
End: 2024-09-04

## 2024-09-04 VITALS
OXYGEN SATURATION: 97 % | TEMPERATURE: 98 F | HEART RATE: 60 BPM | RESPIRATION RATE: 18 BRPM | SYSTOLIC BLOOD PRESSURE: 102 MMHG | DIASTOLIC BLOOD PRESSURE: 69 MMHG

## 2024-09-04 PROCEDURE — 59050 FETAL MONITOR W/REPORT: CPT

## 2024-09-04 PROCEDURE — 85025 COMPLETE CBC W/AUTO DIFF WBC: CPT

## 2024-09-04 PROCEDURE — 86900 BLOOD TYPING SEROLOGIC ABO: CPT

## 2024-09-04 PROCEDURE — 86901 BLOOD TYPING SEROLOGIC RH(D): CPT

## 2024-09-04 PROCEDURE — 86850 RBC ANTIBODY SCREEN: CPT

## 2024-09-04 PROCEDURE — 86780 TREPONEMA PALLIDUM: CPT

## 2024-09-04 RX ORDER — VITAMIN A, ASCORBIC ACID, VITAMIN D, .ALPHA.-TOCOPHEROL, THIAMINE MONONITRATE, RIBOFLAVIN, NIACIN, PYRIDOXINE HYDROCHLORIDE, FOLIC ACID, CYANOCOBALAMIN, CALCIUM, IRON, MAGNESIUM, ZINC, AND COPPER 2700; 70; 400; 30; 1.6; 1.8; 18; 2.5; 1; 12; 100; 65; 25; 25; 2 [IU]/1; MG/1; [IU]/1; [IU]/1; MG/1; MG/1; MG/1; MG/1; MG/1; UG/1; MG/1; MG/1; MG/1; MG/1; MG/1
1 TABLET ORAL
Qty: 0 | Refills: 0 | DISCHARGE
Start: 2024-09-04

## 2024-09-04 RX ADMIN — ACETAMINOPHEN 975 MILLIGRAM(S): 325 TABLET ORAL at 08:59

## 2024-09-04 RX ADMIN — Medication 600 MILLIGRAM(S): at 12:05

## 2024-09-04 RX ADMIN — VITAMIN A, ASCORBIC ACID, VITAMIN D, .ALPHA.-TOCOPHEROL, THIAMINE MONONITRATE, RIBOFLAVIN, NIACIN, PYRIDOXINE HYDROCHLORIDE, FOLIC ACID, CYANOCOBALAMIN, CALCIUM, IRON, MAGNESIUM, ZINC, AND COPPER 1 TABLET(S): 2700; 70; 400; 30; 1.6; 1.8; 18; 2.5; 1; 12; 100; 65; 25; 25; 2 TABLET ORAL at 12:06

## 2024-09-04 RX ADMIN — ACETAMINOPHEN 975 MILLIGRAM(S): 325 TABLET ORAL at 04:02

## 2024-09-04 RX ADMIN — Medication 600 MILLIGRAM(S): at 05:50

## 2024-09-04 RX ADMIN — Medication 600 MILLIGRAM(S): at 00:10

## 2024-09-04 RX ADMIN — ACETAMINOPHEN 975 MILLIGRAM(S): 325 TABLET ORAL at 03:08

## 2024-09-04 RX ADMIN — ACETAMINOPHEN 975 MILLIGRAM(S): 325 TABLET ORAL at 09:30

## 2024-09-04 RX ADMIN — Medication 600 MILLIGRAM(S): at 06:29

## 2024-09-04 RX ADMIN — Medication 600 MILLIGRAM(S): at 12:35

## 2024-09-04 NOTE — DISCHARGE NOTE OB - CARE PLAN
Principal Discharge DX:	 (normal spontaneous vaginal delivery)  Assessment and plan of treatment:	Call your doctor for fevers, chills, nausea, vomiting, headaches that persist, blurry vision, heavy vaginal bleeding, pain that does not improve with medication. No heavy lifting, nothing in the vagina, no submerging your bottom in water.   1

## 2024-09-04 NOTE — DISCHARGE NOTE OB - PATIENT PORTAL LINK FT
You can access the FollowMyHealth Patient Portal offered by [facility] by registering at the following website: [email or web address]. By joining [employer]’s FollowMyHealth portal, you will also be able to view your health information using other applications (apps) compatible with our system.

## 2024-09-04 NOTE — DISCHARGE NOTE OB - MEDICATION SUMMARY - MEDICATIONS TO STOP TAKING
I will STOP taking the medications listed below when I get home from the hospital:    magnesium hydroxide 8% oral suspension  -- 30 milliliter(s) by mouth 2 times a day As needed Constipation

## 2024-09-04 NOTE — DISCHARGE NOTE OB - CARE PROVIDER_API CALL
Anabell Bansal  Maternal/Fetal Medicine  [address] [place] [address], [address]  [place] [address]  Phone: [phone]  Fax: [phone]  Follow Up Time: Routine

## 2024-09-04 NOTE — DISCHARGE NOTE OB - NS MD DC FALL RISK RISK
For information on Fall & Injury Prevention, visit: [email or web address] OR  [email or web address] OR  https://www.cdc.gov/steadi/patient.html

## 2024-09-04 NOTE — PROGRESS NOTE ADULT - ASSESSMENT
A/P: 34yo PPD#1 s/p .  Patient is stable and doing well post-partum.   - Pain well controlled, continue current pain regimen  - Increase ambulation, SCDs when not ambulating  - Continue regular diet    Rachel Holloway PGY1

## 2024-09-04 NOTE — PROGRESS NOTE ADULT - ATTENDING COMMENTS
pt seen and examined. overall doing well. reviewed pp precautions. stable to [place] home.     amadeo zaman

## 2024-09-04 NOTE — DISCHARGE NOTE OB - MEDICATION SUMMARY - MEDICATIONS TO TAKE
I will START or STAY ON the medications listed below when I get home from the hospital:    ibuprofen 600 mg oral tablet  -- 1 tab(s) by mouth every 6 hours  -- Indication: For  (normal spontaneous vaginal delivery)    acetaminophen 325 mg oral tablet  -- 3 tab(s) by mouth every 6 hours as needed for  mild pain  -- Indication: For  (normal spontaneous vaginal delivery)    benzocaine 20% topical spray  -- 1 Apply on skin to affected area every 6 hours As needed for Perineal discomfort  -- Indication: For  (normal spontaneous vaginal delivery)    dibucaine 1% topical ointment  -- 1 Apply on skin to affected area every 6 hours As needed Perineal discomfort  -- Indication: For  (normal spontaneous vaginal delivery)    pramoxine 1% topical cream  -- 1 Apply on skin to affected area every 4 hours As needed Moderate Pain (4-6)  -- Indication: For  (normal spontaneous vaginal delivery)    lanolin topical ointment  -- 1 Apply on skin to affected area every 6 hours As needed nipple soreness  -- Indication: For  (normal spontaneous vaginal delivery)    witch hazel 50% topical pad  -- 1 Apply on skin to affected area every 4 hours As needed Perineal discomfort  -- Indication: For  (normal spontaneous vaginal delivery)    Prenatal Multivitamins with Folic Acid 1 mg oral tablet  -- 1 tab(s) by mouth once a day  -- Indication: For  (normal spontaneous vaginal delivery)    simethicone 80 mg oral tablet, chewable  -- 1 tab(s) by mouth every 4 hours As needed Gas  -- Indication: For  (normal spontaneous vaginal delivery)

## 2024-09-04 NOTE — DISCHARGE NOTE OB - HOSPITAL COURSE
uncomplicated vaginal delivery. uncomplicated postpartum course. stable to dc home on ppd1 in stable condition.

## 2024-09-04 NOTE — PROGRESS NOTE ADULT - SUBJECTIVE AND OBJECTIVE BOX
OB Progress Note:  PPD#1    S: 34yo  PPD#1 s/p . Patient feels well. Pain is well controlled. She is tolerating a regular diet and passing flatus. She is voiding spontaneously, and ambulating without difficulty. Denies CP/SOB. Denies lightheadedness/dizziness. Denies N/V.    O:  Vitals:  Vital Signs Last 24 Hrs  T(C): 36.7 (04 Sep 2024 01:15), Max: 36.9 (03 Sep 2024 08:44)  T(F): 98.1 (04 Sep 2024 01:15), Max: 98.4 (03 Sep 2024 08:44)  HR: 60 (04 Sep 2024 01:15) (59 - 68)  BP: 101/67 (04 Sep 2024 01:15) (98/61 - 122/79)  BP(mean): --  RR: 18 (04 Sep 2024 01:15) (18 - 18)  SpO2: 99% (04 Sep 2024 01:15) (96% - 100%)    Parameters below as of 04 Sep 2024 01:15  Patient On (Oxygen Delivery Method): room air        MEDICATIONS  (STANDING):  acetaminophen     Tablet .. 975 milliGRAM(s) Oral <User Schedule>  diphtheria/tetanus/pertussis (acellular) Vaccine (Adacel) 0.5 milliLiter(s) IntraMuscular once  ibuprofen  Tablet. 600 milliGRAM(s) Oral every 6 hours  oxytocin Infusion 333.333 milliUNIT(s)/Min (1000 mL/Hr) IV Continuous <Continuous>  oxytocin Infusion 41.667 milliUNIT(s)/Min (125 mL/Hr) IV Continuous <Continuous>  prenatal multivitamin 1 Tablet(s) Oral daily  sodium chloride 0.9% lock flush 3 milliLiter(s) IV Push every 8 hours      Labs:  Blood type: A Positive  Rubella IgG: RPR: Negative                          12.8   10.83<H> >-----------< 124<L>    (  @ 23:20 )             37.2                  Physical Exam:  General: NAD  Abdomen: soft, non-tender, non-distended, fundus firm  Vaginal: Lochia wnl  Extremities: No erythema/edema

## 2024-09-10 ENCOUNTER — APPOINTMENT (OUTPATIENT)
Dept: OBGYN | Facility: CLINIC | Age: 34
End: 2024-09-10

## 2024-10-16 ENCOUNTER — APPOINTMENT (OUTPATIENT)
Dept: OBGYN | Facility: CLINIC | Age: 34
End: 2024-10-16
Payer: COMMERCIAL

## 2024-10-16 DIAGNOSIS — M62.89 OTHER SPECIFIED DISORDERS OF MUSCLE: ICD-10-CM

## 2024-10-16 DIAGNOSIS — Z34.92 ENCOUNTER FOR SUPERVISION OF NORMAL PREGNANCY, UNSPECIFIED, SECOND TRIMESTER: ICD-10-CM

## 2024-10-16 DIAGNOSIS — N91.1 SECONDARY AMENORRHEA: ICD-10-CM

## 2024-10-16 DIAGNOSIS — Z34.03 ENCOUNTER FOR SUPERVISION OF NORMAL FIRST PREGNANCY, THIRD TRIMESTER: ICD-10-CM

## 2024-10-16 DIAGNOSIS — Z32.01 ENCOUNTER FOR PREGNANCY TEST, RESULT POSITIVE: ICD-10-CM

## 2024-10-16 DIAGNOSIS — Z36.9 ENCOUNTER FOR ANTENATAL SCREENING, UNSPECIFIED: ICD-10-CM

## 2024-10-16 PROCEDURE — 0503F POSTPARTUM CARE VISIT: CPT

## 2025-04-18 ENCOUNTER — APPOINTMENT (OUTPATIENT)
Dept: OBGYN | Facility: CLINIC | Age: 35
End: 2025-04-18
